# Patient Record
Sex: FEMALE | Race: BLACK OR AFRICAN AMERICAN | HISPANIC OR LATINO | Employment: FULL TIME | URBAN - METROPOLITAN AREA
[De-identification: names, ages, dates, MRNs, and addresses within clinical notes are randomized per-mention and may not be internally consistent; named-entity substitution may affect disease eponyms.]

---

## 2016-11-21 LAB — PHYSICIAN READ PAP  881411: NORMAL

## 2017-04-20 ENCOUNTER — NON-PROVIDER VISIT (OUTPATIENT)
Dept: OBGYN | Facility: CLINIC | Age: 27
End: 2017-04-20
Payer: MEDICAID

## 2017-04-20 DIAGNOSIS — Z32.01 PREGNANCY EXAMINATION OR TEST, POSITIVE RESULT: ICD-10-CM

## 2017-04-20 LAB
INT CON NEG: NEGATIVE
INT CON POS: POSITIVE
POC URINE PREGNANCY TEST: POSITIVE

## 2017-04-20 PROCEDURE — 81025 URINE PREGNANCY TEST: CPT | Performed by: OBSTETRICS & GYNECOLOGY

## 2017-05-17 ENCOUNTER — INITIAL PRENATAL (OUTPATIENT)
Dept: OBGYN | Facility: CLINIC | Age: 27
End: 2017-05-17
Payer: MEDICAID

## 2017-05-17 VITALS
SYSTOLIC BLOOD PRESSURE: 118 MMHG | WEIGHT: 117 LBS | HEIGHT: 63 IN | BODY MASS INDEX: 20.73 KG/M2 | DIASTOLIC BLOOD PRESSURE: 68 MMHG

## 2017-05-17 DIAGNOSIS — N93.8 DUB (DYSFUNCTIONAL UTERINE BLEEDING): ICD-10-CM

## 2017-05-17 PROCEDURE — 76830 TRANSVAGINAL US NON-OB: CPT | Performed by: OBSTETRICS & GYNECOLOGY

## 2017-05-17 PROCEDURE — 99213 OFFICE O/P EST LOW 20 MIN: CPT | Mod: 25 | Performed by: OBSTETRICS & GYNECOLOGY

## 2017-05-17 NOTE — MR AVS SNAPSHOT
"        Astrid Shetty   2017 2:30 PM   Initial Prenatal   MRN: [id number]    [facility]   Dept Phone:  [phone]    Description:  Female : 1990   Provider:  Rene Dodd M.D.           Allergies as of 2017     Allergen Noted Reactions    Lactose 2016   Diarrhea    Diarrhea      Vicodin [Hydrocodone-Acetaminophen] 2012   Palpitations    Dizzy, lightheaded       You were diagnosed with     DUB (dysfunctional uterine bleeding)   [087572]         Vital Signs     Blood Pressure Height Weight Body Mass Index Last Menstrual Period Smoking Status    118/68 mmHg 1.6 m (5' 3\") 53.071 kg (117 lb) 20.73 kg/m2 2017 (Approximate) Never Smoker       Basic Information     Date Of Birth Sex Race Ethnicity Preferred Language    1990 Female Black or  Non- English      Problem List              ICD-10-CM Priority Class Noted - Resolved    Screening for sickle-cell disease or trait - positive Z13.0   2013 - Present    Anemia D64.9   2013 - Present    Postpartum care and examination    8/15/2013 - Present      Health Maintenance        Date Due Completion Dates    IMM HEP B VACCINE (1 of 3 - Primary Series) 1990 ---    IMM HEP A VACCINE (1 of 2 - Standard Series) 1992 ---    IMM HPV VACCINE (1 of 3 - Female 3 Dose Series) 2002 ---    IMM VARICELLA (CHICKENPOX) VACCINE (1 of 2 - 2 Dose Adolescent Series) 2004 ---    PAP SMEAR 2016    IMM DTaP/Tdap/Td Vaccine (2 - Td) 2023            Current Immunizations     Tdap Vaccine 2013  6:15 AM      Below and/or attached are the medications your provider expects you to take. Review all of your home medications and newly ordered medications with your provider and/or pharmacist. Follow medication instructions as directed by your provider and/or pharmacist. Please keep your medication list with you and share with your provider. Update the information " when medications are discontinued, doses are changed, or new medications (including over-the-counter products) are added; and carry medication information at all times in the event of emergency situations     Allergies:  LACTOSE - Diarrhea     VICODIN - Palpitations               Medications  Valid as of: May 17, 2017 -  2:56 PM    Generic Name Brand Name Tablet Size Instructions for use    Oxycodone-Acetaminophen (Tab) PERCOCET 5-325 MG Take 1 Tab by mouth every four hours as needed (pain).        .                 Medicines prescribed today were sent to:     [facility] [address] [place] [address] - [address] [address] [facility] [address]    Phone: [phone] Fax: [phone]    Open 24 Hours?: No      Medication refill instructions:       If your prescription bottle indicates you have medication refills left, it is not necessary to call your provider’s office. Please contact your pharmacy and they will refill your medication.    If your prescription bottle indicates you do not have any refills left, you may request refills at any time through one of the following ways: The online edo system (except Urgent Care), by calling your provider’s office, or by asking your pharmacy to contact your provider’s office with a refill request. Medication refills are processed only during regular business hours and may not be available until the next business day. Your provider may request additional information or to have a follow-up visit with you prior to refilling your medication.   *Please Note: Medication refills are assigned a new Rx number when refilled electronically. Your pharmacy may indicate that no refills were authorized even though a new prescription for the same medication is available at the pharmacy. Please request the medicine by name with the pharmacy before contacting your provider for a refill.        Other Notes About Your Plan     Needs electrophoresis for confirmation of  positive screen.           Snapvine Access Code: [email or web address]  Expires: 5/20/2017  3:10 PM    Snapvine  A secure, online tool to manage your health information     [employer]s Snapvine® is a secure, online tool that connects you to your personalized health information from the privacy of your home -- day or night - making it very easy for you to manage your healthcare. Once the activation process is completed, you can even access your medical information using the Snapvine sana, which is available for free in the Apple Sana store or Google Play store.     Snapvine provides the following levels of access (as shown below):   My Chart Features   [facility] Primary Care Doctor [facility]  Specialists [facility]  Urgent  Care Non-[facility]  Primary Care  Doctor   Email your healthcare team securely and privately 24/7 X X X    Manage appointments: schedule your next appointment; view details of past/upcoming appointments X      Request prescription refills. X      View recent personal medical records, including lab and immunizations X X X X   View health record, including health history, allergies, medications X X X X   Read reports about your outpatient visits, procedures, consult and ER notes X X X X   See your discharge summary, which is a recap of your hospital and/or ER visit that includes your diagnosis, lab results, and care plan. X X       How to register for Snapvine:  1. Go to  [email or web address].  2. Click on the Sign Up Now box, which takes you to the New Member Sign Up page. You will need to provide the following information:  a. Enter your Snapvine Access Code exactly as it appears at the top of this page. (You will not need to use this code after you’ve completed the sign-up process. If you do not sign up before the expiration date, you must request a new code.)   b. Enter your date of birth.   c. Enter your home email address.   d. Click Submit, and follow the next screen’s instructions.  3. Create a Snapvine ID.  This will be your [employer] login ID and cannot be changed, so think of one that is secure and easy to remember.  4. Create a [employer] password. You can change your password at any time.  5. Enter your Password Reset Question and Answer. This can be used at a later time if you forget your password.   6. Enter your e-mail address. This allows you to receive e-mail notifications when new information is available in [employer].  7. Click Sign Up. You can now view your health information.    For assistance activating your [employer] account, call [phone]

## 2017-05-17 NOTE — PROGRESS NOTES
"Astrid Shetty,  26 y.o.  female presents today with a C/O of :oligomenorrhea. Pt   Patient's last menstrual period was 2017 (approximate).               Patient was on OCP's , stopped and became pregnant      Subjective : Nausea/Vomiting: No:  Abdominal /pelvic cramping : No :   vaginal bleeding:No      Menstrual Flow : moderate   GYN ROS:  normal menses, no abnormal bleeding, pelvic pain or discharge, no breast pain or new or enlarging lumps on self exam      Past Medical History   Diagnosis Date   • Allergy      lactose intolerance, vicodin, cherries, tomatoes, pickles, squirt soft drink   • Anemia        History reviewed. No pertinent past surgical history.    Current Birth control:  none    OB History    Para Term  AB SAB TAB Ectopic Multiple Living   2 1 1       1      # Outcome Date GA Lbr Perry/2nd Weight Sex Delivery Anes PTL Lv   2 Current            1 Term 07/10/13 39w1d  3.133 kg (6 lb 14.5 oz) F Vag-Spont EPI N Y      Comments: Denies any complications.              Allergy:      Lactose and Vicodin    Exam;    /68 mmHg  Ht 1.6 m (5' 3\")  Wt 53.071 kg (117 lb)  BMI 20.73 kg/m2  LMP 2017 (Approximate)  well-appearing, well-hydrated  normal;   PERRLA, EOMI, fundi grossly normal, no papilledema, no AV nicking, sclera clear  Clear  RRR No M  abdomen is soft without significant tenderness, masses, organomegaly or guarding  pregnancy pos findings: uterine enlargement: 10 wk size, Lab.    No results found for this or any previous visit (from the past 336 hour(s)).  Ultrasound :     Per my Read   Transvaginal     First trimester findings: Intrauterine gestational sac seen: yes  Gestational sac summary: fetal pole seen, yolk sac seen, fetal cardiac activity, EGA: 10 weeks + 4 days  Fetal cardiac activity: present  Crown-rump length: 3.61 cm  Fetal movement and cardiac   BRENDON 2017    Assessment:    dysfunctional uterine bleeding    Plan:  2 weeks for NOB         "

## 2017-05-17 NOTE — PROGRESS NOTES
[facility]  Last pap done at [facility].   Good # [phone]

## 2017-06-15 ENCOUNTER — HOSPITAL ENCOUNTER (EMERGENCY)
Facility: MEDICAL CENTER | Age: 27
End: 2017-06-16
Attending: EMERGENCY MEDICINE
Payer: MEDICAID

## 2017-06-15 DIAGNOSIS — Z3A.15 15 WEEKS GESTATION OF PREGNANCY: ICD-10-CM

## 2017-06-15 DIAGNOSIS — N39.0 ACUTE UTI: ICD-10-CM

## 2017-06-15 LAB
ALBUMIN SERPL BCP-MCNC: 3.7 G/DL (ref 3.2–4.9)
ALBUMIN/GLOB SERPL: 1.1 G/DL
ALP SERPL-CCNC: 43 U/L (ref 30–99)
ALT SERPL-CCNC: 6 U/L (ref 2–50)
ANION GAP SERPL CALC-SCNC: 9 MMOL/L (ref 0–11.9)
APPEARANCE UR: ABNORMAL
APPEARANCE UR: CLEAR
AST SERPL-CCNC: 12 U/L (ref 12–45)
B-HCG SERPL-ACNC: ABNORMAL MIU/ML (ref 0–5)
BACTERIA #/AREA URNS HPF: ABNORMAL /HPF
BASOPHILS # BLD AUTO: 0.5 % (ref 0–1.8)
BASOPHILS # BLD: 0.02 K/UL (ref 0–0.12)
BILIRUB SERPL-MCNC: 0.4 MG/DL (ref 0.1–1.5)
BILIRUB UR QL STRIP.AUTO: NEGATIVE
BUN SERPL-MCNC: 16 MG/DL (ref 8–22)
CALCIUM SERPL-MCNC: 9 MG/DL (ref 8.5–10.5)
CHLORIDE SERPL-SCNC: 106 MMOL/L (ref 96–112)
CO2 SERPL-SCNC: 20 MMOL/L (ref 20–33)
COLOR UR AUTO: YELLOW
COLOR UR: ABNORMAL
CREAT SERPL-MCNC: 0.54 MG/DL (ref 0.5–1.4)
EOSINOPHIL # BLD AUTO: 0.04 K/UL (ref 0–0.51)
EOSINOPHIL NFR BLD: 0.9 % (ref 0–6.9)
EPI CELLS #/AREA URNS HPF: ABNORMAL /HPF
ERYTHROCYTE [DISTWIDTH] IN BLOOD BY AUTOMATED COUNT: 39.9 FL (ref 35.9–50)
GFR SERPL CREATININE-BSD FRML MDRD: >60 ML/MIN/1.73 M 2
GLOBULIN SER CALC-MCNC: 3.3 G/DL (ref 1.9–3.5)
GLUCOSE SERPL-MCNC: 99 MG/DL (ref 65–99)
GLUCOSE UR QL STRIP.AUTO: NEGATIVE MG/DL
GLUCOSE UR STRIP.AUTO-MCNC: NEGATIVE MG/DL
HCT VFR BLD AUTO: 30.3 % (ref 37–47)
HGB BLD-MCNC: 11 G/DL (ref 12–16)
IMM GRANULOCYTES # BLD AUTO: 0.01 K/UL (ref 0–0.11)
IMM GRANULOCYTES NFR BLD AUTO: 0.2 % (ref 0–0.9)
KETONES UR QL STRIP.AUTO: NEGATIVE MG/DL
KETONES UR STRIP.AUTO-MCNC: NEGATIVE MG/DL
LEUKOCYTE ESTERASE UR QL STRIP.AUTO: ABNORMAL
LEUKOCYTE ESTERASE UR QL STRIP.AUTO: ABNORMAL
LIPASE SERPL-CCNC: 14 U/L (ref 11–82)
LYMPHOCYTES # BLD AUTO: 1.07 K/UL (ref 1–4.8)
LYMPHOCYTES NFR BLD: 24.5 % (ref 22–41)
MCH RBC QN AUTO: 31.4 PG (ref 27–33)
MCHC RBC AUTO-ENTMCNC: 36.3 G/DL (ref 33.6–35)
MCV RBC AUTO: 86.6 FL (ref 81.4–97.8)
MICRO URNS: ABNORMAL
MONOCYTES # BLD AUTO: 0.37 K/UL (ref 0–0.85)
MONOCYTES NFR BLD AUTO: 8.5 % (ref 0–13.4)
NEUTROPHILS # BLD AUTO: 2.86 K/UL (ref 2–7.15)
NEUTROPHILS NFR BLD: 65.4 % (ref 44–72)
NITRITE UR QL STRIP.AUTO: NEGATIVE
NITRITE UR QL STRIP.AUTO: NEGATIVE
NRBC # BLD AUTO: 0 K/UL
NRBC BLD AUTO-RTO: 0 /100 WBC
PH UR STRIP.AUTO: 5 [PH]
PH UR STRIP.AUTO: 5 [PH]
PLATELET # BLD AUTO: 198 K/UL (ref 164–446)
PMV BLD AUTO: 10.7 FL (ref 9–12.9)
POTASSIUM SERPL-SCNC: 3 MMOL/L (ref 3.6–5.5)
PROT SERPL-MCNC: 7 G/DL (ref 6–8.2)
PROT UR QL STRIP: NEGATIVE MG/DL
PROT UR QL STRIP: NEGATIVE MG/DL
RBC # BLD AUTO: 3.5 M/UL (ref 4.2–5.4)
RBC # URNS HPF: ABNORMAL /HPF
RBC UR QL AUTO: NEGATIVE
RBC UR QL AUTO: NEGATIVE
SODIUM SERPL-SCNC: 135 MMOL/L (ref 135–145)
SP GR UR STRIP.AUTO: 1.01
SP GR UR: 1.01
WBC # BLD AUTO: 4.4 K/UL (ref 4.8–10.8)
WBC #/AREA URNS HPF: ABNORMAL /HPF

## 2017-06-15 PROCEDURE — 81001 URINALYSIS AUTO W/SCOPE: CPT

## 2017-06-15 PROCEDURE — 85025 COMPLETE CBC W/AUTO DIFF WBC: CPT

## 2017-06-15 PROCEDURE — 80053 COMPREHEN METABOLIC PANEL: CPT

## 2017-06-15 PROCEDURE — 83690 ASSAY OF LIPASE: CPT

## 2017-06-15 PROCEDURE — 81002 URINALYSIS NONAUTO W/O SCOPE: CPT

## 2017-06-15 PROCEDURE — 700102 HCHG RX REV CODE 250 W/ 637 OVERRIDE(OP): Performed by: EMERGENCY MEDICINE

## 2017-06-15 PROCEDURE — 84702 CHORIONIC GONADOTROPIN TEST: CPT

## 2017-06-15 PROCEDURE — 99284 EMERGENCY DEPT VISIT MOD MDM: CPT

## 2017-06-15 PROCEDURE — A9270 NON-COVERED ITEM OR SERVICE: HCPCS | Performed by: EMERGENCY MEDICINE

## 2017-06-15 RX ORDER — SULFAMETHOXAZOLE AND TRIMETHOPRIM 800; 160 MG/1; MG/1
1 TABLET ORAL EVERY 12 HOURS
Qty: 10 TAB | Refills: 0 | Status: SHIPPED | OUTPATIENT
Start: 2017-06-15 | End: 2017-06-16

## 2017-06-15 RX ORDER — SULFAMETHOXAZOLE AND TRIMETHOPRIM 800; 160 MG/1; MG/1
1 TABLET ORAL ONCE
Status: COMPLETED | OUTPATIENT
Start: 2017-06-16 | End: 2017-06-15

## 2017-06-15 RX ADMIN — SULFAMETHOXAZOLE AND TRIMETHOPRIM 1 TABLET: 800; 160 TABLET ORAL at 23:53

## 2017-06-15 ASSESSMENT — PAIN SCALES - GENERAL: PAINLEVEL_OUTOF10: 7

## 2017-06-15 ASSESSMENT — LIFESTYLE VARIABLES: DO YOU DRINK ALCOHOL: NO

## 2017-06-15 NOTE — ED AVS SNAPSHOT
GoAlbert Access Code: [email or web address]  Expires: 7/15/2017 11:55 PM    GoAlbert  A secure, online tool to manage your health information     [employer]’s GoAlbert® is a secure, online tool that connects you to your personalized health information from the privacy of your home -- day or night - making it very easy for you to manage your healthcare. Once the activation process is completed, you can even access your medical information using the GoAlbert sana, which is available for free in the Apple Sana store or Google Play store.     GoAlbert provides the following levels of access (as shown below):   My Chart Features   [facility] Primary Care Doctor [facility]  Specialists [facility]  Urgent  Care Non-[facility]  Primary Care  Doctor   Email your healthcare team securely and privately 24/7 X X X X   Manage appointments: schedule your next appointment; view details of past/upcoming appointments X      Request prescription refills. X      View recent personal medical records, including lab and immunizations X X X X   View health record, including health history, allergies, medications X X X X   Read reports about your outpatient visits, procedures, consult and ER notes X X X X   See your discharge summary, which is a recap of your hospital and/or ER visit that includes your diagnosis, lab results, and care plan. X X       How to register for GoAlbert:  1. Go to  [email or web address].  2. Click on the Sign Up Now box, which takes you to the New Member Sign Up page. You will need to provide the following information:  a. Enter your GoAlbert Access Code exactly as it appears at the top of this page. (You will not need to use this code after you’ve completed the sign-up process. If you do not sign up before the expiration date, you must request a new code.)   b. Enter your date of birth.   c. Enter your home email address.   d. Click Submit, and follow the next screen’s instructions.  3. Create a GoAlbert ID. This will be your GoAlbert  login ID and cannot be changed, so think of one that is secure and easy to remember.  4. Create a Tr[employer] Systems password. You can change your password at any time.  5. Enter your Password Reset Question and Answer. This can be used at a later time if you forget your password.   6. Enter your e-mail address. This allows you to receive e-mail notifications when new information is available in [employer] Systems.  7. Click Sign Up. You can now view your health information.    For assistance activating your [employer] account, call [phone]

## 2017-06-15 NOTE — ED AVS SNAPSHOT
6/16/2017    Astrid Shetty  [address]  [place] [address]    Dear Astrid:    [facility] wants to ensure your discharge home is safe and you or your loved ones have had all of your questions answered regarding your care after you leave the hospital.    Below is a list of resources and contact information should you have any questions regarding your hospital stay, follow-up instructions, or active medical symptoms.    Questions or Concerns Regarding… Contact   Medical Questions Related to Your Discharge  (7 days a week, 8am-5pm) Contact a Nurse Care Coordinator   [phone]   Medical Questions Not Related to Your Discharge  (24 hours a day / 7 days a week)  Contact the Nurse Health Line   [phone]    Medications or Discharge Instructions Refer to your discharge packet   or contact your [facility] Primary Care Provider   [phone]   Follow-up Appointment(s) Schedule your appointment via Pace4Life   or contact Scheduling [phone]   Billing Review your statement via Pace4Life  or contact Billing [phone]   Medical Records Review your records via Pace4Life   or contact Medical Records [phone]     You may receive a telephone call within two days of discharge. This call is to make certain you understand your discharge instructions and have the opportunity to have any questions answered. You can also easily access your medical information, test results and upcoming appointments via the Pace4Life free online health management tool. You can learn more and sign up at Desigual/Pace4Life. For assistance setting up your Pace4Life account, please call [phone].    Once again, we want to ensure your discharge home is safe and that you have a clear understanding of any next steps in your care. If you have any questions or concerns, please do not hesitate to contact us, we are here for you. Thank you for choosing [facility] for your healthcare needs.    Sincerely,    Your [facility] Healthcare Team

## 2017-06-15 NOTE — ED AVS SNAPSHOT
Home Care Instructions                                                                                                                Astrid Shetty   MRN: [id number]    Department:  [facility], Emergency Dept   Date of Visit:  6/15/2017            [facility] Emergency Dept    [address] [address]    Phone:  [phone]      You were seen by     Tremaine Jack M.D.      Your Diagnosis Was     Acute UTI     N39.0       These are the medications you received during your hospitalization from 06/15/2017 1755 to 06/16/2017 0001     Date/Time Order Dose Route Action    06/15/2017 2353 sulfamethoxazole-trimethoprim (BACTRIM DS) 800-160 MG tablet 1 Tab 1 Tab Oral Given      Follow-up Information     1. Follow up with [facility].    Specialty:  OB/Gyn    Contact information    [address] [address] [phone]        Medication Information     Review all of your home medications and newly ordered medications with your primary doctor and/or pharmacist as soon as possible. Follow medication instructions as directed by your doctor and/or pharmacist.     Please keep your complete medication list with you and share with your physician. Update the information when medications are discontinued, doses are changed, or new medications (including over-the-counter products) are added; and carry medication information at all times in the event of emergency situations.               Medication List      START taking these medications        Instructions    Morning Afternoon Evening Bedtime    sulfamethoxazole-trimethoprim 800-160 MG tablet   Last time this was given:  1 Tab on 6/15/2017 11:53 PM   Commonly known as:  BACTRIM DS        Take 1 Tab by mouth every 12 hours for 5 days.   Dose:  1 Tab                          ASK your doctor about these medications        Instructions    Morning Afternoon Evening Bedtime    PRENATAL 1 PO        Take  by  mouth.                             Where to Get Your Medications      [facility] [place] [address] [place] [address]     Phone:  [phone]    - sulfamethoxazole-trimethoprim 800-160 MG tablet            Procedures and tests performed during your visit     BETA-HCG QUANTITATIVE SERUM    CBC WITH DIFFERENTIAL    COMP METABOLIC PANEL    ESTIMATED GFR    LIPASE    POC UA    URINALYSIS    URINE MICROSCOPIC (W/UA)        Discharge Instructions       Urinary Tract Infection  Urinary tract infections (UTIs) can develop anywhere along your urinary tract. Your urinary tract is your body's drainage system for removing wastes and extra water. Your urinary tract includes two kidneys, two ureters, a bladder, and a urethra. Your kidneys are a pair of bean-shaped organs. Each kidney is about the size of your fist. They are located below your ribs, one on each side of your spine.  CAUSES  Infections are caused by microbes, which are microscopic organisms, including fungi, viruses, and bacteria. These organisms are so small that they can only be seen through a microscope. Bacteria are the microbes that most commonly cause UTIs.  SYMPTOMS   Symptoms of UTIs may vary by age and gender of the patient and by the location of the infection. Symptoms in young women typically include a frequent and intense urge to urinate and a painful, burning feeling in the bladder or urethra during urination. Older women and men are more likely to be tired, shaky, and weak and have muscle aches and abdominal pain. A fever may mean the infection is in your kidneys. Other symptoms of a kidney infection include pain in your back or sides below the ribs, nausea, and vomiting.  DIAGNOSIS  To diagnose a UTI, your caregiver will ask you about your symptoms. Your caregiver also will ask to provide a urine sample. The urine sample will be tested for bacteria and white blood cells. White blood cells  are made by your body to help fight infection.  TREATMENT   Typically, UTIs can be treated with medication. Because most UTIs are caused by a bacterial infection, they usually can be treated with the use of antibiotics. The choice of antibiotic and length of treatment depend on your symptoms and the type of bacteria causing your infection.  HOME CARE INSTRUCTIONS  · If you were prescribed antibiotics, take them exactly as your caregiver instructs you. Finish the medication even if you feel better after you have only taken some of the medication.  · Drink enough water and fluids to keep your urine clear or pale yellow.  · Avoid caffeine, tea, and carbonated beverages. They tend to irritate your bladder.  · Empty your bladder often. Avoid holding urine for long periods of time.  · Empty your bladder before and after sexual intercourse.  · After a bowel movement, women should cleanse from front to back. Use each tissue only once.  SEEK MEDICAL CARE IF:   · You have back pain.  · You develop a fever.  · Your symptoms do not begin to resolve within 3 days.  SEEK IMMEDIATE MEDICAL CARE IF:   · You have severe back pain or lower abdominal pain.  · You develop chills.  · You have nausea or vomiting.  · You have continued burning or discomfort with urination.  MAKE SURE YOU:   · Understand these instructions.  · Will watch your condition.  · Will get help right away if you are not doing well or get worse.     This information is not intended to replace advice given to you by your health care provider. Make sure you discuss any questions you have with your health care provider.     Document Released: 09/27/2006 Document Revised: 01/08/2016 Document Reviewed: 01/25/2013  [employer] Interactive Patient Education ©2016 [employer]            Patient Information     Patient Information    Following emergency treatment: all patient requiring follow-up care must return either to a private physician or a clinic if your condition  worsens before you are able to obtain further medical attention, please return to the emergency room.     Billing Information    At [facility], we work to make the billing process streamlined for our patients.  Our Representatives are here to answer any questions you may have regarding your hospital bill.  If you have insurance coverage and have supplied your insurance information to us, we will submit a claim to your insurer on your behalf.  Should you have any questions regarding your bill, we can be reached online or by phone as follows:  Online: You are able pay your bills online or live chat with our representatives about any billing questions you may have. We are here to help Monday - Friday from 8:00am to 7:30pm and 9:00am - 12:00pm on Saturdays.  Please visit [email or web address]  for more information.   Phone:  [phone] or [phone]    Please note that your emergency physician, surgeon, pathologist, radiologist, anesthesiologist, and other specialists are not employed by [facility] and will therefore bill separately for their services.  Please contact them directly for any questions concerning their bills at the numbers below:     Emergency Physician Services:  [phone]  [facility]:  [phone]  [facility]:  [phone]  [facility]:  [phone]    1. Your final bill may vary from the amount quoted upon discharge if all procedures are not complete at that time, or if your doctor has additional procedures of which we are not aware. You will receive an additional bill if you return to the Emergency Department at [facility] for suture removal regardless of the facility of which the sutures were placed.     2. Please arrange for settlement of this account at the emergency registration.    3. All self-pay accounts are due in full at the time of treatment.  If you are unable to meet this obligation then payment  is expected within 4-5 days.     4. If you have had radiology studies (CT, X-ray, Ultrasound, MRI), you have received a preliminary result during your emergency department visit. Please contact the radiology department [phone] to receive a copy of your final result. Please discuss the Final result with your primary physician or with the follow up physician provided.     Crisis Hotline:  [place] Crisis Hotline:  1-893-UYGKRUX or 1-564.231.4113  [place] Crisis Hotline:    1-425.988.9873 or 082-478-2255         ED Discharge Follow Up Questions    1. In order to provide you with very good care, we would like to follow up with a phone call in the next few days.  May we have your permission to contact you?     YES /  NO    2. What is the best phone number to call you? (       )_____-__________    3. What is the best time to call you?      Morning  /  Afternoon  /  Evening                   Patient Signature:  ____________________________________________________________    Date:  ____________________________________________________________      Your appointments     Jun 28, 2017  2:00 PM   New OB Exam with PC INTAKE, NEW OB   [facility])    [address]  [place] [address]   [phone]

## 2017-06-16 VITALS
BODY MASS INDEX: 20.74 KG/M2 | HEART RATE: 59 BPM | WEIGHT: 117.06 LBS | DIASTOLIC BLOOD PRESSURE: 67 MMHG | RESPIRATION RATE: 14 BRPM | HEIGHT: 63 IN | SYSTOLIC BLOOD PRESSURE: 123 MMHG | OXYGEN SATURATION: 99 % | TEMPERATURE: 98.8 F

## 2017-06-16 RX ORDER — SULFAMETHOXAZOLE AND TRIMETHOPRIM 800; 160 MG/1; MG/1
1 TABLET ORAL EVERY 12 HOURS
Qty: 10 TAB | Refills: 0 | Status: SHIPPED | OUTPATIENT
Start: 2017-06-16 | End: 2017-06-21

## 2017-06-16 NOTE — ED NOTES
Ambulatory to room. Agree with triage note. Denies bleeding or spotting. In gown, chart up for ERP.

## 2017-06-16 NOTE — ED NOTES
Pt ambulates to triage with guest  Chief Complaint   Patient presents with   • Abdominal Cramping     x couple days   • Pregnancy     15 weeks, had ultrasound May 17th   Pt asked to wait in lobby, pt updated on triage process and pt asked to inform RN of any changes.

## 2017-06-16 NOTE — ED NOTES
Verbalizes understanding of discharge and followup instructions. VSS. Prescription sent electronically to patient's pharmacy. Ambulates with steady gait to discharge.

## 2017-06-16 NOTE — ED PROVIDER NOTES
"ED Provider Note    ED Provider Note    Primary care provider: [facility]  Means of arrival: POV  History obtained from: Patient    CHIEF COMPLAINT  Chief Complaint   Patient presents with   • Abdominal Cramping     x couple days   • Pregnancy     15 weeks, had ultrasound May 17th     Seen at 10:39 PM.   HPI  Astrid Nadya Shetty is a 26 y.o.  EGA 15 weeks by ultrasound female who presents to the Emergency Department with 2 days of intermittent low pelvic cramping without modifying factors. Occasionally the cramping radiates to the back.    She denies any fevers, chills, vaginal discharge, dysuria, hematuria, nausea, vomiting. Normal prenatal care. No complications with prior pregnancy.    REVIEW OF SYSTEMS  See HPI,   Remainder of ROS negative.     PAST MEDICAL HISTORY   has a past medical history of Allergy and Anemia.    SURGICAL HISTORY  patient denies any surgical history    SOCIAL HISTORY  Social History   Substance Use Topics   • Smoking status: Never Smoker    • Smokeless tobacco: Never Used   • Alcohol Use: No      Comment: occ      History   Drug Use No       FAMILY HISTORY  Family History   Problem Relation Age of Onset   • Hypertension Mother    • Cancer Maternal Grandmother    • Cancer Maternal Grandfather      lung Cancer       CURRENT MEDICATIONS  Reviewed.  See Encounter Summary.     ALLERGIES  Allergies   Allergen Reactions   • Lactose Diarrhea     Diarrhea     • Vicodin [Hydrocodone-Acetaminophen] Palpitations     Dizzy, lightheaded        PHYSICAL EXAM  VITAL SIGNS: /67 mmHg  Pulse 59  Temp(Src) 37.1 °C (98.8 °F)  Resp 14  Ht 1.6 m (5' 2.99\")  Wt 53.1 kg (117 lb 1 oz)  BMI 20.74 kg/m2  SpO2 99%  LMP 2017 (Approximate)  Constitutional: Awake, alert in no apparent distress.  HENT: Normocephalic, Bilateral external ears normal. Nose normal.   Eyes: Conjunctiva normal, non-icteric, EOMI.    Thorax & Lungs: Easy unlabored respirations, Clear to ascultation " bilaterally.  Cardiovascular: Regular rate, Regular rhythm, No murmurs, rubs or gallops.  Abdomen:  Soft, mild suprapubic tenderness without rebound or guarding, no right upper quadrant or right lower quadrant tenderness, soft, normal active bowel sounds. Gravid abdomen. Fundus well below the umbilicus.  :    Skin: Visualized skin is  Dry, No erythema, No rash.   Musculoskeletal:   No cyanosis, clubbing or edema.  Neurologic: Alert, Grossly non-focal.   Psychiatric: Normal affect, Normal mood  Lymphatic:  No cervical LAD        RADIOLOGY  No orders to display     Bedside ultrasound performed by EP: Normal fetus observed. Fetal heart tones 147. EGA 15 weeks 4 days by head circumference. Normal amniotic fluid. Trace free fluid in the pelvis.    COURSE & MEDICAL DECISION MAKING  Pertinent Labs & Imaging studies reviewed. (See chart for details)    Differential diagnoses include but are not limited to: Spontaneous , discomfort of pregnancy, threatened     10:39 PM - Patient seen and examined at bedside.     11:53 PM- urinalysis reveals an acute UTI.      Decision Making:  This is a 26 y.o. year old female who presents with low pelvic cramping for the past 24 hours. The abdominal exam is quite benign, I do not suspect acute appendicitis. After evaluation is also unremarkable. Bedside ultrasound reveals a viable IUP.    The urinalysis does reveal a UTI which certainly would give the patient low pelvic pain. She'll be treated with Bactrim. I recommend taking Tylenol as needed for the aches and pains. She should return for any severe rectal pain, fevers, intractable vomiting or any other concern.    Also her blood pressure slightly elevated today, there is no evidence of preeclampsia. Recommend she follow-up with her primary care physician for additional management.    The patient will be discharged home in stable condition.    FINAL IMPRESSION  1. Acute UTI    2. 15 weeks gestation of pregnancy

## 2017-06-16 NOTE — DISCHARGE INSTRUCTIONS
Urinary Tract Infection  Urinary tract infections (UTIs) can develop anywhere along your urinary tract. Your urinary tract is your body's drainage system for removing wastes and extra water. Your urinary tract includes two kidneys, two ureters, a bladder, and a urethra. Your kidneys are a pair of bean-shaped organs. Each kidney is about the size of your fist. They are located below your ribs, one on each side of your spine.  CAUSES  Infections are caused by microbes, which are microscopic organisms, including fungi, viruses, and bacteria. These organisms are so small that they can only be seen through a microscope. Bacteria are the microbes that most commonly cause UTIs.  SYMPTOMS   Symptoms of UTIs may vary by age and gender of the patient and by the location of the infection. Symptoms in young women typically include a frequent and intense urge to urinate and a painful, burning feeling in the bladder or urethra during urination. Older women and men are more likely to be tired, shaky, and weak and have muscle aches and abdominal pain. A fever may mean the infection is in your kidneys. Other symptoms of a kidney infection include pain in your back or sides below the ribs, nausea, and vomiting.  DIAGNOSIS  To diagnose a UTI, your caregiver will ask you about your symptoms. Your caregiver also will ask to provide a urine sample. The urine sample will be tested for bacteria and white blood cells. White blood cells are made by your body to help fight infection.  TREATMENT   Typically, UTIs can be treated with medication. Because most UTIs are caused by a bacterial infection, they usually can be treated with the use of antibiotics. The choice of antibiotic and length of treatment depend on your symptoms and the type of bacteria causing your infection.  HOME CARE INSTRUCTIONS  · If you were prescribed antibiotics, take them exactly as your caregiver instructs you. Finish the medication even if you feel better after you  have only taken some of the medication.  · Drink enough water and fluids to keep your urine clear or pale yellow.  · Avoid caffeine, tea, and carbonated beverages. They tend to irritate your bladder.  · Empty your bladder often. Avoid holding urine for long periods of time.  · Empty your bladder before and after sexual intercourse.  · After a bowel movement, women should cleanse from front to back. Use each tissue only once.  SEEK MEDICAL CARE IF:   · You have back pain.  · You develop a fever.  · Your symptoms do not begin to resolve within 3 days.  SEEK IMMEDIATE MEDICAL CARE IF:   · You have severe back pain or lower abdominal pain.  · You develop chills.  · You have nausea or vomiting.  · You have continued burning or discomfort with urination.  MAKE SURE YOU:   · Understand these instructions.  · Will watch your condition.  · Will get help right away if you are not doing well or get worse.     This information is not intended to replace advice given to you by your health care provider. Make sure you discuss any questions you have with your health care provider.     Document Released: 09/27/2006 Document Revised: 01/08/2016 Document Reviewed: 01/25/2013  [employer] Patient Education ©2016 [employer]

## 2017-06-28 ENCOUNTER — INITIAL PRENATAL (OUTPATIENT)
Dept: OBGYN | Facility: CLINIC | Age: 27
End: 2017-06-28
Payer: MEDICAID

## 2017-06-28 ENCOUNTER — HOSPITAL ENCOUNTER (OUTPATIENT)
Facility: MEDICAL CENTER | Age: 27
End: 2017-06-28
Attending: NURSE PRACTITIONER
Payer: MEDICAID

## 2017-06-28 VITALS — SYSTOLIC BLOOD PRESSURE: 106 MMHG | WEIGHT: 118 LBS | BODY MASS INDEX: 20.91 KG/M2 | DIASTOLIC BLOOD PRESSURE: 60 MMHG

## 2017-06-28 DIAGNOSIS — Z34.81 PRENATAL CARE, SUBSEQUENT PREGNANCY, FIRST TRIMESTER: Primary | ICD-10-CM

## 2017-06-28 DIAGNOSIS — Z34.81 PRENATAL CARE, SUBSEQUENT PREGNANCY, FIRST TRIMESTER: ICD-10-CM

## 2017-06-28 LAB
APPEARANCE UR: NORMAL
BILIRUB UR STRIP-MCNC: NORMAL MG/DL
COLOR UR AUTO: NORMAL
GLUCOSE UR STRIP.AUTO-MCNC: NEGATIVE MG/DL
KETONES UR STRIP.AUTO-MCNC: NEGATIVE MG/DL
LEUKOCYTE ESTERASE UR QL STRIP.AUTO: NORMAL
NITRITE UR QL STRIP.AUTO: NEGATIVE
PH UR STRIP.AUTO: 6 [PH] (ref 5–8)
PROT UR QL STRIP: NEGATIVE MG/DL
RBC UR QL AUTO: NEGATIVE
SP GR UR STRIP.AUTO: 1.02
UROBILINOGEN UR STRIP-MCNC: NORMAL MG/DL

## 2017-06-28 PROCEDURE — 87591 N.GONORRHOEAE DNA AMP PROB: CPT

## 2017-06-28 PROCEDURE — 81002 URINALYSIS NONAUTO W/O SCOPE: CPT | Performed by: NURSE PRACTITIONER

## 2017-06-28 PROCEDURE — 59401 PR NEW OB VISIT: CPT | Performed by: NURSE PRACTITIONER

## 2017-06-28 PROCEDURE — 87491 CHLMYD TRACH DNA AMP PROBE: CPT

## 2017-06-28 ASSESSMENT — ENCOUNTER SYMPTOMS
MUSCULOSKELETAL NEGATIVE: 1
RESPIRATORY NEGATIVE: 1
NEUROLOGICAL NEGATIVE: 1
PSYCHIATRIC NEGATIVE: 1
GASTROINTESTINAL NEGATIVE: 1
CONSTITUTIONAL NEGATIVE: 1
CARDIOVASCULAR NEGATIVE: 1
EYES NEGATIVE: 1

## 2017-06-28 NOTE — MR AVS SNAPSHOT
Astridselvin Voell   2017 2:00 PM   Initial Prenatal   MRN: [id number]    Department[facility]   Dept Phone:  [phone]    Description:  Female : 1990   Provider:  Wendy Faust C.N.M.; PC INTAKE           Allergies as of 2017     Allergen Noted Reactions    Lactose 2016   Diarrhea    Diarrhea      Vicodin [Hydrocodone-Acetaminophen] 2012   Palpitations    Dizzy, lightheaded       You were diagnosed with     Prenatal care, subsequent pregnancy, first trimester   [025248]         Vital Signs     Blood Pressure Weight Last Menstrual Period Smoking Status          106/60 mmHg 53.524 kg (118 lb) 2017 (Approximate) Never Smoker         Basic Information     Date Of Birth Sex Race Ethnicity Preferred Language    1990 Female Black or  Non- English      Problem List              ICD-10-CM Priority Class Noted - Resolved    Screening for sickle-cell disease or trait - positive Z13.0   2013 - Present    Anemia D64.9   2013 - Present    Postpartum care and examination    8/15/2013 - Present      Health Maintenance        Date Due Completion Dates    IMM HEP B VACCINE (1 of 3 - Primary Series) 1991 ---    IMM HEP A VACCINE (1 of 2 - Standard Series) 1992 ---    IMM HPV VACCINE (1 of 3 - Female 3 Dose Series) 2002 ---    IMM VARICELLA (CHICKENPOX) VACCINE (1 of 2 - 2 Dose Adolescent Series) 2004 ---    PAP SMEAR 2016    IMM DTaP/Tdap/Td Vaccine (2 - Td) 2023            Results     POCT Urinalysis      Component Value Standard Range & Units    POC Color  Negative    POC Appearance  Negative    POC Leukocyte Esterase Trace Negative    POC Nitrites Negative Negative    POC Urobiligen  Negative (0.2) mg/dL    POC Protein Negative Negative mg/dL    POC Urine PH 6.0 5.0 - 8.0    POC Blood Negative Negative    POC Specific Gravity 1.020 <1.005 - >1.030    POC Ketones Negative Negative mg/dL    POC Biliruben  Negative mg/dL    POC Glucose Negative Negative mg/dL                        Current Immunizations     Tdap Vaccine 7/11/2013  6:15 AM      Below and/or attached are the medications your provider expects you to take. Review all of your home medications and newly ordered medications with your provider and/or pharmacist. Follow medication instructions as directed by your provider and/or pharmacist. Please keep your medication list with you and share with your provider. Update the information when medications are discontinued, doses are changed, or new medications (including over-the-counter products) are added; and carry medication information at all times in the event of emergency situations     Allergies:  LACTOSE - Diarrhea     VICODIN - Palpitations               Medications  Valid as of: June 28, 2017 -  3:25 PM    Generic Name Brand Name Tablet Size Instructions for use    Prenatal MV-Min-Fe Fum-FA-DHA   Take  by mouth.        .                 Medicines prescribed today were sent to:     [facility] [address] [place] [address] [address] [facility]    [address] [place] [id number]    Phone: [phone] Fax: [phone]    Open 24 Hours?: No      Medication refill instructions:       If your prescription bottle indicates you have medication refills left, it is not necessary to call your provider’s office. Please contact your pharmacy and they will refill your medication.    If your prescription bottle indicates you do not have any refills left, you may request refills at any time through one of the following ways: The online [employer] system (except Urgent Care), by calling your provider’s office, or by asking your pharmacy to contact your provider’s office with a refill request. Medication refills are processed only during regular business hours and may not be available until the next business day. Your provider may request additional information or to have a follow-up visit with you prior to refilling your  medication.   *Please Note: Medication refills are assigned a new Rx number when refilled electronically. Your pharmacy may indicate that no refills were authorized even though a new prescription for the same medication is available at the pharmacy. Please request the medicine by name with the pharmacy before contacting your provider for a refill.        Your To Do List     Future Labs/Procedures Complete By Expires    AFP TETRA  As directed 6/28/2018    CHLAMYDIA/GC PCR URINE OR SWAB  As directed 6/28/2018    PREG CNTR PRENATAL PN  As directed 6/28/2018    US-OB 2ND 3RD TRI COMPLETE  As directed 6/28/2018      Other Notes About Your Plan     Needs electrophoresis for confirmation of positive screen.           [employer] Access Code: [email or web address]  Expires: 7/15/2017 11:55 PM    An[employer]Bio  A secure, online tool to manage your health information     [employer]’s An[employer]® is a secure, online tool that connects you to your personalized health information from the privacy of your home -- day or night - making it very easy for you to manage your healthcare. Once the activation process is completed, you can even access your medical information using the An[employer]Bio sana, which is available for free in the Apple Sana store or Google Play store.     [employer] provides the following levels of access (as shown below):   My Chart Features   [facility] Doctor [facility]  Urgent  Care [facility]  Primary Care  Doctor   Email your healthcare team securely and privately 24/7 X X X    Manage appointments: schedule your next appointment; view details of past/upcoming appointments X      Request prescription refills. X      View recent personal medical records, including lab and immunizations X X X X   View health record, including health history, allergies, medications X X X X   Read reports about your outpatient visits, procedures, consult and ER notes X X X X   See your discharge summary, which is a recap of  your hospital and/or ER visit that includes your diagnosis, lab results, and care plan. X X       How to register for [employer]:  1. Go to  [email or web address].  2. Click on the Sign Up Now box, which takes you to the New Member Sign Up page. You will need to provide the following information:  a. Enter your [employer] Access Code exactly as it appears at the top of this page. (You will not need to use this code after you’ve completed the sign-up process. If you do not sign up before the expiration date, you must request a new code.)   b. Enter your date of birth.   c. Enter your home email address.   d. Click Submit, and follow the next screen’s instructions.  3. Create a [employer] ID. This will be your [employer] login ID and cannot be changed, so think of one that is secure and easy to remember.  4. Create a [employer] password. You can change your password at any time.  5. Enter your Password Reset Question and Answer. This can be used at a later time if you forget your password.   6. Enter your e-mail address. This allows you to receive e-mail notifications when new information is available in [employer].  7. Click Sign Up. You can now view your health information.    For assistance activating your [employer] account, call [phone]

## 2017-06-28 NOTE — PROGRESS NOTES
S:  Astrid Shetty is a 26 y.o.  who presents for her new OB exam.  She is 16w4d with and BRENDON of Estimated Date of Delivery: 17 based off of US at 10w4d. She has no complaints.  She is currently working at [employer]. Discussed heavy lifting and chemical exposure. One ER visit for UTI, finished ABT, no problems. No other previous care in this pregnancy.     Desires AFP.  Declines CF.  Denies VB, LOF, or cramping.  Denies dysuria, vaginal DC. Reports some fetal movement.     Pt is single and lives with mother and daughter.  Pregnancy is unplanned but desired.      Past Medical History   Diagnosis Date   • Allergy      lactose intolerance, vicodin, cherries, tomatoes, pickles, squirt soft drink   • Anemia      Family History   Problem Relation Age of Onset   • Hypertension Mother    • Cancer Maternal Grandmother    • Cancer Maternal Grandfather      lung Cancer     Social History     Social History   • Marital Status: Single     Spouse Name: N/A   • Number of Children: N/A   • Years of Education: N/A     Occupational History   • Not on file.     Social History Main Topics   • Smoking status: Never Smoker    • Smokeless tobacco: Never Used   • Alcohol Use: No      Comment: occ   • Drug Use: No   • Sexual Activity:     Partners: Male     Birth Control/ Protection: Pill     Other Topics Concern   • Not on file     Social History Narrative    ** Merged History Encounter **          OB History    Para Term  AB SAB TAB Ectopic Multiple Living   2 1 1       1      # Outcome Date GA Lbr Perry/2nd Weight Sex Delivery Anes PTL Lv   2 Current            1 Term 07/10/13 39w1d  3.133 kg (6 lb 14.5 oz) F Vag-Spont EPI N Y      Comments: Denies any complications.          History of Varicella Virus: Yes  History of HSV I or II in self or partner: no  History of Thyroid problems: no    O:  Blood pressure 106/60, weight 53.524 kg (118 lb), last menstrual period 2017.   See Prenatal  Physical.    Wet mount: deferred, no s/sx      A:   1.  IUP @ 16w4d per LMP c/w 10w4d us        2.  S=D        3.  See problem list below               Patient Active Problem List    Diagnosis Date Noted   • Postpartum care and examination 08/15/2013   • Anemia 04/02/2013   • Screening for sickle-cell disease or trait - positive 01/07/2013         P:  1.  GC/CT & pap done        2.  Prenatal labs ordered - lab slip given        3.  Discussed PNV, diet, avoidances and adequate water intake        4.  NOB packet given        5.  Return to office in 4 wks        6.  Complete OB US in 5-6 wks        7.  AFP    No orders of the defined types were placed in this encounter.       HPI    Review of Systems   Constitutional: Negative.    HENT: Negative.    Eyes: Negative.    Respiratory: Negative.    Cardiovascular: Negative.    Gastrointestinal: Negative.    Genitourinary: Negative.    Musculoskeletal: Negative.    Skin: Negative.    Neurological: Negative.    Endo/Heme/Allergies: Negative.    Psychiatric/Behavioral: Negative.    All other systems reviewed and are negative.         Objective:     /60 mmHg  Wt 53.524 kg (118 lb)  LMP 02/17/2017 (Approximate)     Physical Exam   Constitutional: She is oriented to person, place, and time. She appears well-developed and well-nourished.   HENT:   Head: Normocephalic and atraumatic.   Nose: Nose normal.   Eyes: Conjunctivae and EOM are normal.   Neck: Normal range of motion. Neck supple.   Cardiovascular: Normal rate, regular rhythm, normal heart sounds and intact distal pulses.    Pulmonary/Chest: Effort normal and breath sounds normal.   Abdominal: Soft. Bowel sounds are normal.   Genitourinary: Vagina normal. Uterus is enlarged.   Musculoskeletal: Normal range of motion.   Neurological: She is alert and oriented to person, place, and time. She has normal reflexes.   Skin: Skin is warm and dry.   Psychiatric: She has a normal mood and affect. Her behavior is normal.  Judgment and thought content normal.   Nursing note and vitals reviewed.              Assessment/Plan:     1. Prenatal care, subsequent pregnancy, first trimester  BRENDON 12/09/2017 by LMP, c/w US at 10w4d  - CONSENT FOR CYSTIC FIBROSIS CARRIER TEST  - POCT Urinalysis  - PREG CNTR PRENATAL PN; Future  - CHLAMYDIA/GC PCR URINE OR SWAB; Future  - AFP TETRA; Future  - US-OB 2ND 3RD TRI COMPLETE; Future

## 2017-06-28 NOTE — PROGRESS NOTES
Pt. Here for NOB visit today.  # [phone]  First prenatal care  Pt. States no complaints   Pharmacy verified  Pt would like AFP.

## 2017-06-29 LAB
C TRACH DNA SPEC QL NAA+PROBE: NEGATIVE
N GONORRHOEA DNA SPEC QL NAA+PROBE: NEGATIVE
SPECIMEN SOURCE: NORMAL

## 2017-07-24 ENCOUNTER — HOSPITAL ENCOUNTER (OUTPATIENT)
Dept: LAB | Facility: MEDICAL CENTER | Age: 27
End: 2017-07-24
Attending: NURSE PRACTITIONER
Payer: MEDICAID

## 2017-07-24 ENCOUNTER — TELEPHONE (OUTPATIENT)
Dept: OBGYN | Facility: CLINIC | Age: 27
End: 2017-07-24

## 2017-07-24 DIAGNOSIS — Z34.81 PRENATAL CARE, SUBSEQUENT PREGNANCY, FIRST TRIMESTER: ICD-10-CM

## 2017-07-24 LAB
ABO GROUP BLD: NORMAL
APPEARANCE UR: ABNORMAL
BACTERIA #/AREA URNS HPF: ABNORMAL /HPF
BASOPHILS # BLD AUTO: 0.6 % (ref 0–1.8)
BASOPHILS # BLD: 0.03 K/UL (ref 0–0.12)
BILIRUB UR QL STRIP.AUTO: NEGATIVE
BLD GP AB SCN SERPL QL: NORMAL
COLOR UR: YELLOW
CULTURE IF INDICATED INDCX: YES UA CULTURE
EOSINOPHIL # BLD AUTO: 0.04 K/UL (ref 0–0.51)
EOSINOPHIL NFR BLD: 0.8 % (ref 0–6.9)
EPI CELLS #/AREA URNS HPF: ABNORMAL /HPF
ERYTHROCYTE [DISTWIDTH] IN BLOOD BY AUTOMATED COUNT: 42.6 FL (ref 35.9–50)
GLUCOSE UR STRIP.AUTO-MCNC: NEGATIVE MG/DL
HBV SURFACE AG SER QL: NEGATIVE
HCT VFR BLD AUTO: 32.1 % (ref 37–47)
HGB BLD-MCNC: 11.3 G/DL (ref 12–16)
HIV 1+2 AB+HIV1 P24 AG SERPL QL IA: NON REACTIVE
HYALINE CASTS #/AREA URNS LPF: ABNORMAL /LPF
IMM GRANULOCYTES # BLD AUTO: 0.01 K/UL (ref 0–0.11)
IMM GRANULOCYTES NFR BLD AUTO: 0.2 % (ref 0–0.9)
KETONES UR STRIP.AUTO-MCNC: NEGATIVE MG/DL
LEUKOCYTE ESTERASE UR QL STRIP.AUTO: ABNORMAL
LYMPHOCYTES # BLD AUTO: 0.82 K/UL (ref 1–4.8)
LYMPHOCYTES NFR BLD: 16.4 % (ref 22–41)
MCH RBC QN AUTO: 31.7 PG (ref 27–33)
MCHC RBC AUTO-ENTMCNC: 35.2 G/DL (ref 33.6–35)
MCV RBC AUTO: 89.9 FL (ref 81.4–97.8)
MICRO URNS: ABNORMAL
MONOCYTES # BLD AUTO: 0.34 K/UL (ref 0–0.85)
MONOCYTES NFR BLD AUTO: 6.8 % (ref 0–13.4)
NEUTROPHILS # BLD AUTO: 3.77 K/UL (ref 2–7.15)
NEUTROPHILS NFR BLD: 75.2 % (ref 44–72)
NITRITE UR QL STRIP.AUTO: NEGATIVE
NRBC # BLD AUTO: 0 K/UL
NRBC BLD AUTO-RTO: 0 /100 WBC
PH UR STRIP.AUTO: 7 [PH]
PLATELET # BLD AUTO: 201 K/UL (ref 164–446)
PMV BLD AUTO: 11.3 FL (ref 9–12.9)
PROT UR QL STRIP: NEGATIVE MG/DL
RBC # BLD AUTO: 3.57 M/UL (ref 4.2–5.4)
RBC # URNS HPF: ABNORMAL /HPF
RBC UR QL AUTO: ABNORMAL
RH BLD: NORMAL
RUBV AB SER QL: 79.8 IU/ML
SP GR UR STRIP.AUTO: 1.02
TREPONEMA PALLIDUM IGG+IGM AB [PRESENCE] IN SERUM OR PLASMA BY IMMUNOASSAY: NON REACTIVE
UROBILINOGEN UR STRIP.AUTO-MCNC: 0.2 MG/DL
WBC # BLD AUTO: 5 K/UL (ref 4.8–10.8)
WBC #/AREA URNS HPF: ABNORMAL /HPF

## 2017-07-24 PROCEDURE — 86780 TREPONEMA PALLIDUM: CPT

## 2017-07-24 PROCEDURE — 87340 HEPATITIS B SURFACE AG IA: CPT

## 2017-07-24 PROCEDURE — 87389 HIV-1 AG W/HIV-1&-2 AB AG IA: CPT

## 2017-07-24 PROCEDURE — 87086 URINE CULTURE/COLONY COUNT: CPT

## 2017-07-24 PROCEDURE — 85025 COMPLETE CBC W/AUTO DIFF WBC: CPT

## 2017-07-24 PROCEDURE — 86900 BLOOD TYPING SEROLOGIC ABO: CPT

## 2017-07-24 PROCEDURE — 86850 RBC ANTIBODY SCREEN: CPT

## 2017-07-24 PROCEDURE — 36415 COLL VENOUS BLD VENIPUNCTURE: CPT

## 2017-07-24 PROCEDURE — 81511 FTL CGEN ABNOR FOUR ANAL: CPT

## 2017-07-24 PROCEDURE — 81001 URINALYSIS AUTO W/SCOPE: CPT

## 2017-07-24 PROCEDURE — 86901 BLOOD TYPING SEROLOGIC RH(D): CPT

## 2017-07-24 PROCEDURE — 86762 RUBELLA ANTIBODY: CPT

## 2017-07-24 NOTE — TELEPHONE ENCOUNTER
Pt lvm on nurse line stating she thinks she has a yeast infection but does not know how to treat it. I consulted with Feli Faust she said have pt use monistat 7 and to call back if symptoms get worse.Pt verbalized understanding.

## 2017-07-26 LAB
# FETUSES US: NORMAL
AFP MOM SERPL: 1.04
AFP SERPL-MCNC: 79 NG/ML
AGE - REPORTED: 26.4 YR
BACTERIA UR CULT: NORMAL
GA METHOD: NORMAL
GA: 20.29 WEEKS
HCG MOM SERPL: 0.89
HCG SERPL-ACNC: NORMAL IU/L
IDDM PATIENT QL: NO
INHIBIN A MOM SERPL: 0.86
INHIBIN A SERPL-MCNC: 159 PG/ML
INTEGRATED SCN PATIENT-IMP: NORMAL
PATHOLOGY STUDY: NORMAL
SIGNIFICANT IND 70042: NORMAL
SOURCE SOURCE: NORMAL
U ESTRIOL MOM SERPL: 1.2
U ESTRIOL SERPL-MCNC: 2.85 NG/ML

## 2017-07-27 ENCOUNTER — APPOINTMENT (OUTPATIENT)
Dept: RADIOLOGY | Facility: IMAGING CENTER | Age: 27
End: 2017-07-27
Attending: NURSE PRACTITIONER
Payer: MEDICAID

## 2017-07-27 ENCOUNTER — ROUTINE PRENATAL (OUTPATIENT)
Dept: OBGYN | Facility: CLINIC | Age: 27
End: 2017-07-27
Payer: MEDICAID

## 2017-07-27 ENCOUNTER — DATING (OUTPATIENT)
Dept: OBGYN | Facility: CLINIC | Age: 27
End: 2017-07-27

## 2017-07-27 VITALS — BODY MASS INDEX: 21.26 KG/M2 | SYSTOLIC BLOOD PRESSURE: 110 MMHG | WEIGHT: 120 LBS | DIASTOLIC BLOOD PRESSURE: 64 MMHG

## 2017-07-27 DIAGNOSIS — Z34.82 ENCOUNTER FOR SUPERVISION OF OTHER NORMAL PREGNANCY IN SECOND TRIMESTER: ICD-10-CM

## 2017-07-27 DIAGNOSIS — Z13.0 SCREENING FOR SICKLE-CELL DISEASE OR TRAIT: ICD-10-CM

## 2017-07-27 DIAGNOSIS — Z34.81 PRENATAL CARE, SUBSEQUENT PREGNANCY, FIRST TRIMESTER: ICD-10-CM

## 2017-07-27 PROBLEM — Z34.92 SUPERVISION OF NORMAL PREGNANCY IN SECOND TRIMESTER: Status: ACTIVE | Noted: 2017-07-27

## 2017-07-27 PROCEDURE — 90040 PR PRENATAL FOLLOW UP: CPT | Performed by: NURSE PRACTITIONER

## 2017-07-27 PROCEDURE — 76805 OB US >/= 14 WKS SNGL FETUS: CPT | Performed by: OBSTETRICS & GYNECOLOGY

## 2017-07-27 NOTE — PROGRESS NOTES
S: Astrid Nadya Diogenes at 21o5reperyifl for OB  follow up visit.  Patient  Has no complaints today   Fetal movement is +  Denies any loss of fluid, vaginal bleeding or contractions.    O: VSS  Urine dip NE today has yeast infection and using OTC cream will recheck for yeast and her urine at next visit  See above values  Cervical exam NE    A: multip 20w5d  Sickle cell trait carrier  Anemia     P:   2nd   Trimester Guidance and comfort measures, diet and exercise review.  Labs:  Reviewed labs with client   Encouraged partner testing for Sickle Cell   hemoglobin electrophoresis ordered for client   RTC in 4 weeks   Ferrous Sulfate 325 mg  1 tab 30 min before meals with OJ

## 2017-07-27 NOTE — PROGRESS NOTES
Pt here today for OB follow up  Pt states no complaints   Reports +  Good # [phone]  Pharmacy Confirmed.  Pt had u/s today.

## 2017-07-27 NOTE — MR AVS SNAPSHOT
Astridselvin Voell   2017 1:30 PM   Routine Prenatal   MRN: [id number]    [facility]   Dept Phone:  [phone]    Description:  Female : 1990   Provider:  LEONARDA Basilio           Allergies as of 2017     Allergen Noted Reactions    Lactose 2016   Diarrhea    Diarrhea      Vicodin [Hydrocodone-Acetaminophen] 2012   Palpitations    Dizzy, lightheaded       You were diagnosed with     Encounter for supervision of other normal pregnancy in second trimester   [7656978]       Screening for sickle-cell disease or trait   [V78.2.ICD-9-CM]         Vital Signs     Blood Pressure Weight Last Menstrual Period Smoking Status          110/64 mmHg 54.432 kg (120 lb) 2017 (Approximate) Never Smoker         Basic Information     Date Of Birth Sex Race Ethnicity Preferred Language    1990 Female Black or  Non- English      Problem List              ICD-10-CM Priority Class Noted - Resolved    Screening for sickle-cell disease or trait - positive Z13.0   2013 - Present    Anemia D64.9   2013 - Present    Supervision of normal pregnancy in second trimester Z34.92   2017 - Present      Health Maintenance        Date Due Completion Dates    IMM HEP B VACCINE (1 of 3 - Primary Series) 1990 ---    IMM HEP A VACCINE (1 of 2 - Standard Series) 1992 ---    IMM HPV VACCINE (1 of 3 - Female 3 Dose Series) 2002 ---    IMM VARICELLA (CHICKENPOX) VACCINE (1 of 2 - 2 Dose Adolescent Series) 2004 ---    IMM INFLUENZA (1) 2017 ---    PAP SMEAR 2019, 2013    IMM DTaP/Tdap/Td Vaccine (2 - Td) 2023            Current Immunizations     Tdap Vaccine 2013  6:15 AM      Below and/or attached are the medications your provider expects you to take. Review all of your home medications and newly ordered medications with your provider and/or pharmacist. Follow medication instructions as  directed by your provider and/or pharmacist. Please keep your medication list with you and share with your provider. Update the information when medications are discontinued, doses are changed, or new medications (including over-the-counter products) are added; and carry medication information at all times in the event of emergency situations     Allergies:  LACTOSE - Diarrhea     VICODIN - Palpitations               Medications  Valid as of: July 27, 2017 -  2:14 PM    Generic Name Brand Name Tablet Size Instructions for use    Prenatal MV-Min-Fe Fum-FA-DHA   Take  by mouth.        .                 Medicines prescribed today were sent to:     [facility] [address] [place] [address], [place] - [address]    [address] [place] [address]    Phone: [phone] Fax: [phone]    Open 24 Hours?: No      Medication refill instructions:       If your prescription bottle indicates you have medication refills left, it is not necessary to call your provider’s office. Please contact your pharmacy and they will refill your medication.    If your prescription bottle indicates you do not have any refills left, you may request refills at any time through one of the following ways: The online [employer] system (except Urgent Care), by calling your provider’s office, or by asking your pharmacy to contact your provider’s office with a refill request. Medication refills are processed only during regular business hours and may not be available until the next business day. Your provider may request additional information or to have a follow-up visit with you prior to refilling your medication.   *Please Note: Medication refills are assigned a new Rx number when refilled electronically. Your pharmacy may indicate that no refills were authorized even though a new prescription for the same medication is available at the pharmacy. Please request the medicine by name with the pharmacy before contacting your provider for a refill.        Your To Do List      Future Labs/Procedures Complete By Expires    HEMOGLOBINOPATHY PROFILE  As directed 7/27/2018      Other Notes About Your Plan     Needs electrophoresis for confirmation of positive screen.           [employer] Access Code: [email or web address]  Expires: 8/26/2017  2:06 PM    [employer]  A secure, online tool to manage your health information     [employer]’s [employer]® is a secure, online tool that connects you to your personalized health information from the privacy of your home -- day or night - making it very easy for you to manage your healthcare. Once the activation process is completed, you can even access your medical information using the [employer] sana, which is available for free in the Apple Sana store or Google Play store.     [employer] provides the following levels of access (as shown below):   My Chart Features   [facility] Doctor [facility]  Urgent  Care [facility]  Primary Care  Doctor   Email your healthcare team securely and privately 24/7 X X X    Manage appointments: schedule your next appointment; view details of past/upcoming appointments X      Request prescription refills. X      View recent personal medical records, including lab and immunizations X X X X   View health record, including health history, allergies, medications X X X X   Read reports about your outpatient visits, procedures, consult and ER notes X X X X   See your discharge summary, which is a recap of your hospital and/or ER visit that includes your diagnosis, lab results, and care plan. X X       How to register for [employer]:  1. Go to  [email or web address].  2. Click on the Sign Up Now box, which takes you to the New Member Sign Up page. You will need to provide the following information:  a. Enter your [employer] Access Code exactly as it appears at the top of this page. (You will not need to use this code after you’ve completed the sign-up process. If you do not sign up before the expiration date, you  must request a new code.)   b. Enter your date of birth.   c. Enter your home email address.   d. Click Submit, and follow the next screen’s instructions.  3. Create a [employer] ID. This will be your Woto login ID and cannot be changed, so think of one that is secure and easy to remember.  4. Create a [employer] password. You can change your password at any time.  5. Enter your Password Reset Question and Answer. This can be used at a later time if you forget your password.   6. Enter your e-mail address. This allows you to receive e-mail notifications when new information is available in Woto.  7. Click Sign Up. You can now view your health information.    For assistance activating your W[employer] account, call [phone]

## 2017-08-24 ENCOUNTER — ROUTINE PRENATAL (OUTPATIENT)
Dept: OBGYN | Facility: CLINIC | Age: 27
End: 2017-08-24
Payer: MEDICAID

## 2017-08-24 VITALS — WEIGHT: 127 LBS | BODY MASS INDEX: 22.5 KG/M2 | SYSTOLIC BLOOD PRESSURE: 104 MMHG | DIASTOLIC BLOOD PRESSURE: 58 MMHG

## 2017-08-24 DIAGNOSIS — Z13.0 SCREENING FOR SICKLE-CELL DISEASE OR TRAIT: ICD-10-CM

## 2017-08-24 DIAGNOSIS — Z34.82 ENCOUNTER FOR SUPERVISION OF OTHER NORMAL PREGNANCY IN SECOND TRIMESTER: ICD-10-CM

## 2017-08-24 PROCEDURE — 90040 PR PRENATAL FOLLOW UP: CPT | Performed by: NURSE PRACTITIONER

## 2017-08-24 NOTE — PROGRESS NOTES
OB Follow Up  +FM  Pt denies complaints.   AFP normal  PNP lab work done  U/S done on 7/27/2017  1 hr glucose lab work given today and explained.   [phone]

## 2017-08-24 NOTE — MR AVS SNAPSHOT
Astridselvin Voell   2017 3:45 PM   Routine Prenatal   MRN: [id number]    [facility]   Dept Phone:  [phone]    Description:  Female : 1990   Provider:  LEONARDA Basilio           Allergies as of 2017     Allergen Noted Reactions    Lactose 2016   Diarrhea    Diarrhea      Vicodin [Hydrocodone-Acetaminophen] 2012   Palpitations    Dizzy, lightheaded       You were diagnosed with     Encounter for supervision of other normal pregnancy in second trimester   [[id number]]       Screening for sickle-cell disease or trait   [V78.2.ICD-9-CM]         Vital Signs     Blood Pressure Weight Last Menstrual Period Smoking Status          104/58 mmHg 57.607 kg (127 lb) 2017 (Approximate) Never Smoker         Basic Information     Date Of Birth Sex Race Ethnicity Preferred Language    1990 Female Black or  Non- English      Problem List              ICD-10-CM Priority Class Noted - Resolved    Screening for sickle-cell disease or trait - positive Z13.0   2013 - Present    Anemia D64.9   2013 - Present    Supervision of normal pregnancy in second trimester Z34.92   2017 - Present      Health Maintenance        Date Due Completion Dates    IMM HEP B VACCINE (1 of 3 - Primary Series) 1991 ---    IMM HEP A VACCINE (1 of 2 - Standard Series) 1992 ---    IMM HPV VACCINE (1 of 3 - Female 3 Dose Series) 2002 ---    IMM VARICELLA (CHICKENPOX) VACCINE (1 of 2 - 2 Dose Adolescent Series) 2004 ---    IMM INFLUENZA (1) 2017 ---    PAP SMEAR 2019, 2013    IMM DTaP/Tdap/Td Vaccine (2 - Td) 2023            Current Immunizations     Tdap Vaccine 2013  6:15 AM      Below and/or attached are the medications your provider expects you to take. Review all of your home medications and newly ordered medications with your provider and/or pharmacist. Follow medication instructions as  directed by your provider and/or pharmacist. Please keep your medication list with you and share with your provider. Update the information when medications are discontinued, doses are changed, or new medications (including over-the-counter products) are added; and carry medication information at all times in the event of emergency situations     Allergies:  LACTOSE - Diarrhea     VICODIN - Palpitations               Medications  Valid as of: August 24, 2017 -  4:04 PM    Generic Name Brand Name Tablet Size Instructions for use    Prenatal MV-Min-Fe Fum-FA-DHA   Take  by mouth.        .                 Medicines prescribed today were sent to:     [facility] [address] [place] [address], [place] - [address]    [address] [place] [address]    Phone: [phone] Fax: [phone]    Open 24 Hours?: No      Medication refill instructions:       If your prescription bottle indicates you have medication refills left, it is not necessary to call your provider’s office. Please contact your pharmacy and they will refill your medication.    If your prescription bottle indicates you do not have any refills left, you may request refills at any time through one of the following ways: The online [employer] system (except Urgent Care), by calling your provider’s office, or by asking your pharmacy to contact your provider’s office with a refill request. Medication refills are processed only during regular business hours and may not be available until the next business day. Your provider may request additional information or to have a follow-up visit with you prior to refilling your medication.   *Please Note: Medication refills are assigned a new Rx number when refilled electronically. Your pharmacy may indicate that no refills were authorized even though a new prescription for the same medication is available at the pharmacy. Please request the medicine by name with the pharmacy before contacting your provider for a refill.        Your To Do  List     Future Labs/Procedures Complete By Expires    GLUCOSE 1HR GESTATIONAL  As directed 2/21/2018    HCT  As directed 8/25/2018    HGB  As directed 8/25/2018    T.PALLIDUM AB EIA  As directed 8/24/2018      Other Notes About Your Plan     Needs electrophoresis for confirmation of positive screen.           [employer] Access Code: Activation code not generated  Current [employer] Status: Active

## 2017-08-27 ENCOUNTER — HOSPITAL ENCOUNTER (OUTPATIENT)
Facility: MEDICAL CENTER | Age: 27
End: 2017-08-27
Attending: OBSTETRICS & GYNECOLOGY | Admitting: OBSTETRICS & GYNECOLOGY
Payer: MEDICAID

## 2017-08-27 ENCOUNTER — HOSPITAL ENCOUNTER (EMERGENCY)
Facility: MEDICAL CENTER | Age: 27
End: 2017-08-27
Attending: EMERGENCY MEDICINE
Payer: MEDICAID

## 2017-08-27 VITALS
WEIGHT: 127 LBS | SYSTOLIC BLOOD PRESSURE: 116 MMHG | HEART RATE: 77 BPM | OXYGEN SATURATION: 96 % | DIASTOLIC BLOOD PRESSURE: 47 MMHG | HEIGHT: 63 IN | BODY MASS INDEX: 22.5 KG/M2 | RESPIRATION RATE: 16 BRPM | TEMPERATURE: 97.4 F

## 2017-08-27 VITALS — HEART RATE: 74 BPM | DIASTOLIC BLOOD PRESSURE: 57 MMHG | SYSTOLIC BLOOD PRESSURE: 105 MMHG

## 2017-08-27 DIAGNOSIS — R10.30 LOWER ABDOMINAL PAIN: ICD-10-CM

## 2017-08-27 DIAGNOSIS — Z3A.25 25 WEEKS GESTATION OF PREGNANCY: ICD-10-CM
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
## 2017-08-27 PROCEDURE — 307740 HCHG GREEN TRAUMA TEAM SERVICES

## 2017-08-27 PROCEDURE — 99284 EMERGENCY DEPT VISIT MOD MDM: CPT

## 2017-08-27 PROCEDURE — 700111 HCHG RX REV CODE 636 W/ 250 OVERRIDE (IP)

## 2017-08-27 RX ORDER — METHYLERGONOVINE MALEATE 0.2 MG/ML
INJECTION INTRAVENOUS
Status: DISCONTINUED
Start: 2017-08-27 | End: 2017-08-27 | Stop reason: HOSPADM

## 2017-08-28 NOTE — PROGRESS NOTES
1937- Pt denies any cramping or bleeding. She reports GFM.  [place] has occasional contractions and irritability. Pt given PTL precautions and educated to return if she starts isiah, bleeding or has decreased FM. Pt verbalized understanding. Sent home walking with family
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
## 2017-08-31 ENCOUNTER — TELEPHONE (OUTPATIENT)
Dept: OBGYN | Facility: CLINIC | Age: 27
End: 2017-08-31

## 2017-09-12 ENCOUNTER — HOSPITAL ENCOUNTER (OUTPATIENT)
Dept: LAB | Facility: MEDICAL CENTER | Age: 27
End: 2017-09-12
Attending: NURSE PRACTITIONER
Payer: MEDICAID

## 2017-09-12 DIAGNOSIS — Z13.0 SCREENING FOR SICKLE-CELL DISEASE OR TRAIT: ICD-10-CM

## 2017-09-12 DIAGNOSIS — Z34.82 ENCOUNTER FOR SUPERVISION OF OTHER NORMAL PREGNANCY IN SECOND TRIMESTER: ICD-10-CM

## 2017-09-12 LAB
GLUCOSE 1H P 50 G GLC PO SERPL-MCNC: 109 MG/DL (ref 70–139)
HCT VFR BLD AUTO: 33.7 % (ref 37–47)
HGB BLD-MCNC: 11.8 G/DL (ref 12–16)
TREPONEMA PALLIDUM IGG+IGM AB [PRESENCE] IN SERUM OR PLASMA BY IMMUNOASSAY: NON REACTIVE

## 2017-09-12 PROCEDURE — 36415 COLL VENOUS BLD VENIPUNCTURE: CPT

## 2017-09-12 PROCEDURE — 86780 TREPONEMA PALLIDUM: CPT

## 2017-09-12 PROCEDURE — 85018 HEMOGLOBIN: CPT

## 2017-09-12 PROCEDURE — 83021 HEMOGLOBIN CHROMOTOGRAPHY: CPT

## 2017-09-12 PROCEDURE — 85014 HEMATOCRIT: CPT

## 2017-09-12 PROCEDURE — 82950 GLUCOSE TEST: CPT

## 2017-09-14 LAB
DEPRECATED HGB OTHER BLD-IMP: 0 % (ref 0–0)
HGB A1 MFR BLD: 57.1 % (ref 95–97.9)
HGB A2 MFR BLD: 3.7 % (ref 2–3.5)
HGB C MFR BLD: 0 % (ref 0–0)
HGB E MFR BLD: 0 % (ref 0–0)
HGB F MFR BLD: 0.3 % (ref 0–2.1)
HGB FRACT BLD ELPH-IMP: ABNORMAL
HGB S BLD QL SOLY: ABNORMAL
HGB S MFR BLD: 38.9 % (ref 0–0)
PATH INTERP BLD-IMP: ABNORMAL

## 2017-09-15 PROBLEM — D57.3 SICKLE CELL TRAIT (HCC): Status: ACTIVE | Noted: 2017-09-15

## 2017-09-21 ENCOUNTER — ROUTINE PRENATAL (OUTPATIENT)
Dept: OBGYN | Facility: CLINIC | Age: 27
End: 2017-09-21
Payer: MEDICAID

## 2017-09-21 VITALS — DIASTOLIC BLOOD PRESSURE: 58 MMHG | SYSTOLIC BLOOD PRESSURE: 100 MMHG

## 2017-09-21 DIAGNOSIS — Z34.82 ENCOUNTER FOR SUPERVISION OF OTHER NORMAL PREGNANCY IN SECOND TRIMESTER: Primary | ICD-10-CM

## 2017-09-21 DIAGNOSIS — D57.3 SICKLE CELL TRAIT (HCC): ICD-10-CM

## 2017-09-21 PROCEDURE — 90040 PR PRENATAL FOLLOW UP: CPT | Performed by: NURSE PRACTITIONER

## 2017-09-21 PROCEDURE — 90471 IMMUNIZATION ADMIN: CPT | Performed by: NURSE PRACTITIONER

## 2017-09-21 PROCEDURE — 90715 TDAP VACCINE 7 YRS/> IM: CPT | Performed by: NURSE PRACTITIONER

## 2017-09-21 NOTE — PROGRESS NOTES
S:  Pt is  at 28w5d for routine OB follow up.  No concerns today.  Reports good FM.  Denies VB, LOF, RUCs or vaginal DC.    O:  Please see above vitals.        FHTs: 144        Fundal ht: 28 cm.        S=D      A:  IUP at 28w5d  Patient Active Problem List    Diagnosis Date Noted   • Sickle cell trait (CMS-HCC) 09/15/2017   • Supervision of normal pregnancy in second trimester 2017   • Anemia 2013   • Screening for sickle-cell disease or trait - positive 2013        P:  1.  Declines BTL.          2.  Instructions given on FKCs.          3.  Questions answered.          4.  Encouraged pt to tour L&D.          5.  Encourage adequate water intake.        6.   labor precautions reviewed.         7.  F/u 2 wks.        8.  TDap today.

## 2017-09-21 NOTE — PROGRESS NOTES
Ob f/u. + fetal movement   No VB, LOF or contractions   C/O no complaints today   Phone number # [phone]  Pharmacy verified with patient  WT=  134 lbs            EL=282/58  Kick count given today with instructions   Tdap vaccine given. 09/21/2017 Right  Deltoid. VIS given and screening check list reviewed with pt.  09/21/2017 right

## 2017-09-22 ENCOUNTER — TELEPHONE (OUTPATIENT)
Dept: OBGYN | Facility: CLINIC | Age: 27
End: 2017-09-22

## 2017-09-22 NOTE — TELEPHONE ENCOUNTER
Called pt to notify her that paper work is ready to .    Unable to contact pt msg left to call back.

## 2017-10-05 ENCOUNTER — ROUTINE PRENATAL (OUTPATIENT)
Dept: OBGYN | Facility: CLINIC | Age: 27
End: 2017-10-05
Payer: MEDICAID

## 2017-10-05 VITALS — WEIGHT: 134 LBS | DIASTOLIC BLOOD PRESSURE: 60 MMHG | SYSTOLIC BLOOD PRESSURE: 104 MMHG | BODY MASS INDEX: 23.74 KG/M2

## 2017-10-05 DIAGNOSIS — D57.3 SICKLE CELL TRAIT (HCC): ICD-10-CM

## 2017-10-05 DIAGNOSIS — Z34.82 ENCOUNTER FOR SUPERVISION OF OTHER NORMAL PREGNANCY IN SECOND TRIMESTER: Primary | ICD-10-CM

## 2017-10-05 PROCEDURE — 90040 PR PRENATAL FOLLOW UP: CPT | Performed by: NURSE PRACTITIONER

## 2017-10-05 NOTE — PATIENT INSTRUCTIONS
P:  1.  Flu vaccine declined.        2.  Continue FKCs.          3.  Questions answered.          4.  Encouraged pt to tour L&D.          5.  Encourage adequate water intake.        6.  F/u 2 wks.        7.  UA ordered.        8.  FYI: none.

## 2017-10-05 NOTE — PROGRESS NOTES
S:  Pt is  at 30w5d for routine OB follow up.  No c/o.  Reports good FM.  Denies VB, LOF, RUCs or vaginal DC.    O:  Please see above vitals.        FHTs: 151        Fundal ht: 31 cm.        1hr GTT: wnl -- reviewed w pt.    A:  IUP at 30w5d  Patient Active Problem List    Diagnosis Date Noted   • Sickle cell trait (CMS-[facility]) 09/15/2017   • Supervision of normal pregnancy in second trimester 2017   • Anemia 2013   • Screening for sickle-cell disease or trait - positive 2013        P:  1.  Flu vaccine declined.        2.  Continue FKCs.          3.  Questions answered.          4.  Encouraged pt to tour L&D.          5.  Encourage adequate water intake.        6.  F/u 2 wks.        7.  UA ordered.        8.  FYI: none.

## 2017-10-05 NOTE — PROGRESS NOTES
OB f/u. + fetal movement.  No VB, LOF or UC's.  Wt: 134lb      BP:104/60  Good phone # [phone]  Preferred pharmacy confirmed.  Flu vaccine offered. Patient declined  BTL declined

## 2017-10-18 ENCOUNTER — ROUTINE PRENATAL (OUTPATIENT)
Dept: OBGYN | Facility: CLINIC | Age: 27
End: 2017-10-18
Payer: MEDICAID

## 2017-10-18 VITALS — DIASTOLIC BLOOD PRESSURE: 60 MMHG | WEIGHT: 139.5 LBS | BODY MASS INDEX: 24.71 KG/M2 | SYSTOLIC BLOOD PRESSURE: 110 MMHG

## 2017-10-18 DIAGNOSIS — D57.3 SICKLE CELL TRAIT (HCC): ICD-10-CM

## 2017-10-18 PROCEDURE — 90040 PR PRENATAL FOLLOW UP: CPT | Performed by: NURSE PRACTITIONER

## 2017-10-18 NOTE — PROGRESS NOTES
Pt. Here for OB/FU today. Reports Good FM.   Good # [phone]  Pt states having Juan Miller.   Pharmacy verified.   Flu vaccine declined.   Tdap given 9/21/17  BTL declined.

## 2017-11-02 ENCOUNTER — ROUTINE PRENATAL (OUTPATIENT)
Dept: OBGYN | Facility: CLINIC | Age: 27
End: 2017-11-02
Payer: MEDICAID

## 2017-11-02 VITALS — DIASTOLIC BLOOD PRESSURE: 64 MMHG | SYSTOLIC BLOOD PRESSURE: 112 MMHG | BODY MASS INDEX: 24.98 KG/M2 | WEIGHT: 141 LBS

## 2017-11-02 DIAGNOSIS — D57.3 SICKLE CELL TRAIT (HCC): ICD-10-CM

## 2017-11-02 DIAGNOSIS — Z34.92 ENCOUNTER FOR SUPERVISION OF NORMAL PREGNANCY IN SECOND TRIMESTER, UNSPECIFIED GRAVIDITY: ICD-10-CM

## 2017-11-02 PROCEDURE — 90040 PR PRENATAL FOLLOW UP: CPT | Performed by: NURSE PRACTITIONER

## 2017-11-02 NOTE — PROGRESS NOTES
S) Pt is a 27 y.o.   at 34w5d  gestation. Routine prenatal care today. No complaints today. Discussed GBS to be done at next visit.    Fetal movement Normal  Cramping no,  VB no  LOF no   Denies dysuria. Generally feels well today. Good self-care activities identified. Denies headaches, swelling, visual changes, or epigastric pain .     O) Blood pressure 112/64, weight 64 kg (141 lb), last menstrual period 2017.        Labs:       PNL: WNL       GCT: 109       AFP: Normal       GBS: Next visit       Pertinent ultrasound -        17- Survey WNL, XIOMARA 14.66cm, c/w prev dating    A) IUP at 34w5d       S=D         Patient Active Problem List    Diagnosis Date Noted   • Sickle cell trait (CMS-[facility]) 09/15/2017     Priority: High   • Screening for sickle-cell disease or trait - positive 2013     Priority: High   • Supervision of normal pregnancy in second trimester 2017     Priority: Medium   • Anemia 2013     Priority: Medium                 TDAP: yes       FLU: no        BTL: no       : n/a    P) s/s ptl vs general discomforts. Fetal movements reviewed. General ed and anticipatory guidance. Nutrition/exercise/vitamin. Plans breast Plans pp contraception- unsure  Continue PNV.

## 2017-11-02 NOTE — PROGRESS NOTES
OB f/u. + fetal movement.  No VB, LOF or UC's.  Good phone # [phone]  Preferred pharmacy confirmed  Pt reports no problems at this time

## 2017-11-09 ENCOUNTER — ROUTINE PRENATAL (OUTPATIENT)
Dept: OBGYN | Facility: CLINIC | Age: 27
End: 2017-11-09
Payer: MEDICAID

## 2017-11-09 ENCOUNTER — HOSPITAL ENCOUNTER (OUTPATIENT)
Facility: MEDICAL CENTER | Age: 27
End: 2017-11-09
Attending: NURSE PRACTITIONER
Payer: MEDICAID

## 2017-11-09 VITALS — WEIGHT: 143.6 LBS | DIASTOLIC BLOOD PRESSURE: 60 MMHG | SYSTOLIC BLOOD PRESSURE: 112 MMHG | BODY MASS INDEX: 25.44 KG/M2

## 2017-11-09 DIAGNOSIS — Z34.80 SUPERVISION OF OTHER NORMAL PREGNANCY, ANTEPARTUM: ICD-10-CM

## 2017-11-09 DIAGNOSIS — D57.3 SICKLE CELL TRAIT (HCC): ICD-10-CM

## 2017-11-09 PROCEDURE — 87653 STREP B DNA AMP PROBE: CPT

## 2017-11-09 PROCEDURE — 90040 PR PRENATAL FOLLOW UP: CPT | Performed by: NURSE PRACTITIONER

## 2017-11-09 ASSESSMENT — PATIENT HEALTH QUESTIONNAIRE - PHQ9: CLINICAL INTERPRETATION OF PHQ2 SCORE: 0

## 2017-11-09 NOTE — PROGRESS NOTES
Pt. here for Ob f/u and GBS today. Good # [phone]  Good FM  Pt states no complaints.   Pharmacy verified.

## 2017-11-10 NOTE — PROGRESS NOTES
SUBJECTIVE:  Pt is a 27 y.o.   at 35w5d  gestation. Presents today for follow-up prenatal care. Reports no issues at this time.  Reports good  fetal movement. Denies cramping/contractions, bleeding or leaking of fluid. Denies dysuria, headaches, N/V, or other issues at this time. Generally feels well today.     OBJECTIVE:  - See prenatal vitals flow  -   Vitals:    17 1544   BP: 112/60   Weight: 65.1 kg (143 lb 9.6 oz)      - Pertinent Labs: normal prenatal panel, normal glucose. Sickle +  - Pertinent ultrasound: Normal fetal survey            ASSESSMENT:   - IUP at 35w5d   - S=D   -   Patient Active Problem List    Diagnosis Date Noted   • Sickle cell trait (CMS-[facility]) 09/15/2017     Priority: High   • Screening for sickle-cell disease or trait - positive 2013     Priority: High   • Supervision of normal pregnancy in second trimester 2017     Priority: Medium   • Anemia 2013     Priority: Medium         PLAN:  - S/sx pregnancy and labor warning signs vs general discomforts discussed  - Fetal movements and kick counts reviewed   - Adequate hydration reinforced  - Nutrition/exercise/vitamin education: continued PNV  -GBS done today. Anticipates LARC for contraception.

## 2017-11-11 LAB — GP B STREP DNA SPEC QL NAA+PROBE: NEGATIVE

## 2017-11-17 ENCOUNTER — ROUTINE PRENATAL (OUTPATIENT)
Dept: OBGYN | Facility: CLINIC | Age: 27
End: 2017-11-17
Payer: MEDICAID

## 2017-11-17 VITALS — SYSTOLIC BLOOD PRESSURE: 102 MMHG | DIASTOLIC BLOOD PRESSURE: 60 MMHG | BODY MASS INDEX: 25.86 KG/M2 | WEIGHT: 146 LBS

## 2017-11-17 DIAGNOSIS — Z13.0 SCREENING FOR SICKLE-CELL DISEASE OR TRAIT: ICD-10-CM

## 2017-11-17 DIAGNOSIS — D57.3 SICKLE CELL TRAIT (HCC): ICD-10-CM

## 2017-11-17 PROCEDURE — 90040 PR PRENATAL FOLLOW UP: CPT | Performed by: OBSTETRICS & GYNECOLOGY

## 2017-11-17 NOTE — PROGRESS NOTES
27 y.o.  36w6d The patient is here for routine obstetrical followup. She reports good fetal movement. She denies regular contractions, vaginal bleeding, or leaking of fluid.  Feels occasional sharp pains, which she thinks may be contractions.     The patient's pregnancy is complicated by   Patient Active Problem List    Diagnosis Date Noted   • Sickle cell trait (CMS-[facility]) 09/15/2017     Priority: High   • Screening for sickle-cell disease or trait - positive 2013     Priority: High   • Supervision of normal pregnancy in second trimester 2017     Priority: Medium   • Anemia 2013     Priority: Medium         Followup in 1 week   labor precautions were discussed with patient  Fetal kick counts were discussed with patient

## 2017-11-17 NOTE — PROGRESS NOTES
Pt here today for OB follow up  Reports +FM  C/o pelvic pain and contractions that make it hard for her to breath  Good # [phone]

## 2017-11-25 ENCOUNTER — HOSPITAL ENCOUNTER (INPATIENT)
Facility: MEDICAL CENTER | Age: 27
LOS: 2 days | End: 2017-11-27
Attending: OBSTETRICS & GYNECOLOGY | Admitting: OBSTETRICS & GYNECOLOGY
Payer: MEDICAID

## 2017-11-25 LAB
BASOPHILS # BLD AUTO: 0.2 % (ref 0–1.8)
BASOPHILS # BLD: 0.01 K/UL (ref 0–0.12)
EOSINOPHIL # BLD AUTO: 0.01 K/UL (ref 0–0.51)
EOSINOPHIL NFR BLD: 0.2 % (ref 0–6.9)
ERYTHROCYTE [DISTWIDTH] IN BLOOD BY AUTOMATED COUNT: 43 FL (ref 35.9–50)
ERYTHROCYTE [DISTWIDTH] IN BLOOD BY AUTOMATED COUNT: 43.5 FL (ref 35.9–50)
HCT VFR BLD AUTO: 34.5 % (ref 37–47)
HCT VFR BLD AUTO: 35.9 % (ref 37–47)
HGB BLD-MCNC: 12.4 G/DL (ref 12–16)
HGB BLD-MCNC: 12.8 G/DL (ref 12–16)
HOLDING TUBE BB 8507: NORMAL
IMM GRANULOCYTES # BLD AUTO: 0.03 K/UL (ref 0–0.11)
IMM GRANULOCYTES NFR BLD AUTO: 0.5 % (ref 0–0.9)
LYMPHOCYTES # BLD AUTO: 0.8 K/UL (ref 1–4.8)
LYMPHOCYTES NFR BLD: 13.6 % (ref 22–41)
MCH RBC QN AUTO: 32.4 PG (ref 27–33)
MCH RBC QN AUTO: 32.9 PG (ref 27–33)
MCHC RBC AUTO-ENTMCNC: 35.7 G/DL (ref 33.6–35)
MCHC RBC AUTO-ENTMCNC: 35.9 G/DL (ref 33.6–35)
MCV RBC AUTO: 90.9 FL (ref 81.4–97.8)
MCV RBC AUTO: 91.5 FL (ref 81.4–97.8)
MONOCYTES # BLD AUTO: 0.42 K/UL (ref 0–0.85)
MONOCYTES NFR BLD AUTO: 7.1 % (ref 0–13.4)
NEUTROPHILS # BLD AUTO: 4.63 K/UL (ref 2–7.15)
NEUTROPHILS NFR BLD: 78.4 % (ref 44–72)
NRBC # BLD AUTO: 0 K/UL
NRBC BLD AUTO-RTO: 0 /100 WBC
PLATELET # BLD AUTO: 150 K/UL (ref 164–446)
PLATELET # BLD AUTO: 153 K/UL (ref 164–446)
PMV BLD AUTO: 10.7 FL (ref 9–12.9)
PMV BLD AUTO: 11.2 FL (ref 9–12.9)
RBC # BLD AUTO: 3.77 M/UL (ref 4.2–5.4)
RBC # BLD AUTO: 3.95 M/UL (ref 4.2–5.4)
WBC # BLD AUTO: 10.9 K/UL (ref 4.8–10.8)
WBC # BLD AUTO: 5.9 K/UL (ref 4.8–10.8)

## 2017-11-25 PROCEDURE — 10907ZC DRAINAGE OF AMNIOTIC FLUID, THERAPEUTIC FROM PRODUCTS OF CONCEPTION, VIA NATURAL OR ARTIFICIAL OPENING: ICD-10-PCS | Performed by: STUDENT IN AN ORGANIZED HEALTH CARE EDUCATION/TRAINING PROGRAM

## 2017-11-25 PROCEDURE — 700102 HCHG RX REV CODE 250 W/ 637 OVERRIDE(OP): Performed by: NURSE PRACTITIONER

## 2017-11-25 PROCEDURE — 304965 HCHG RECOVERY SERVICES

## 2017-11-25 PROCEDURE — A9270 NON-COVERED ITEM OR SERVICE: HCPCS | Performed by: NURSE PRACTITIONER

## 2017-11-25 PROCEDURE — 770002 HCHG ROOM/CARE - OB PRIVATE (112)

## 2017-11-25 PROCEDURE — 36415 COLL VENOUS BLD VENIPUNCTURE: CPT

## 2017-11-25 PROCEDURE — 700105 HCHG RX REV CODE 258: Performed by: NURSE PRACTITIONER

## 2017-11-25 PROCEDURE — 700111 HCHG RX REV CODE 636 W/ 250 OVERRIDE (IP)

## 2017-11-25 PROCEDURE — 85027 COMPLETE CBC AUTOMATED: CPT

## 2017-11-25 PROCEDURE — 303615 HCHG EPIDURAL/SPINAL ANESTHESIA FOR LABOR

## 2017-11-25 PROCEDURE — 59409 OBSTETRICAL CARE: CPT

## 2017-11-25 PROCEDURE — 85025 COMPLETE CBC W/AUTO DIFF WBC: CPT

## 2017-11-25 PROCEDURE — 0HQ9XZZ REPAIR PERINEUM SKIN, EXTERNAL APPROACH: ICD-10-PCS | Performed by: STUDENT IN AN ORGANIZED HEALTH CARE EDUCATION/TRAINING PROGRAM

## 2017-11-25 PROCEDURE — 700111 HCHG RX REV CODE 636 W/ 250 OVERRIDE (IP): Performed by: NURSE PRACTITIONER

## 2017-11-25 RX ORDER — ONDANSETRON 4 MG/1
4 TABLET, ORALLY DISINTEGRATING ORAL EVERY 6 HOURS PRN
Status: DISCONTINUED | OUTPATIENT
Start: 2017-11-25 | End: 2017-11-27 | Stop reason: HOSPADM

## 2017-11-25 RX ORDER — VITAMIN A ACETATE, BETA CAROTENE, ASCORBIC ACID, CHOLECALCIFEROL, .ALPHA.-TOCOPHEROL ACETATE, DL-, THIAMINE MONONITRATE, RIBOFLAVIN, NIACINAMIDE, PYRIDOXINE HYDROCHLORIDE, FOLIC ACID, CYANOCOBALAMIN, CALCIUM CARBONATE, FERROUS FUMARATE, ZINC OXIDE, CUPRIC OXIDE 3080; 12; 120; 400; 1; 1.84; 3; 20; 22; 920; 25; 200; 27; 10; 2 [IU]/1; UG/1; MG/1; [IU]/1; MG/1; MG/1; MG/1; MG/1; MG/1; [IU]/1; MG/1; MG/1; MG/1; MG/1; MG/1
1 TABLET, FILM COATED ORAL EVERY MORNING
Status: DISCONTINUED | OUTPATIENT
Start: 2017-11-26 | End: 2017-11-27 | Stop reason: HOSPADM

## 2017-11-25 RX ORDER — ONDANSETRON 2 MG/ML
4 INJECTION INTRAMUSCULAR; INTRAVENOUS EVERY 6 HOURS PRN
Status: DISCONTINUED | OUTPATIENT
Start: 2017-11-25 | End: 2017-11-27 | Stop reason: HOSPADM

## 2017-11-25 RX ORDER — DEXTROSE, SODIUM CHLORIDE, SODIUM LACTATE, POTASSIUM CHLORIDE, AND CALCIUM CHLORIDE 5; .6; .31; .03; .02 G/100ML; G/100ML; G/100ML; G/100ML; G/100ML
INJECTION, SOLUTION INTRAVENOUS CONTINUOUS
Status: DISCONTINUED | OUTPATIENT
Start: 2017-11-25 | End: 2017-11-25 | Stop reason: HOSPADM

## 2017-11-25 RX ORDER — MISOPROSTOL 200 UG/1
800 TABLET ORAL
Status: DISCONTINUED | OUTPATIENT
Start: 2017-11-25 | End: 2017-11-25 | Stop reason: HOSPADM

## 2017-11-25 RX ORDER — OXYCODONE AND ACETAMINOPHEN 10; 325 MG/1; MG/1
1 TABLET ORAL EVERY 4 HOURS PRN
Status: DISCONTINUED | OUTPATIENT
Start: 2017-11-25 | End: 2017-11-27 | Stop reason: HOSPADM

## 2017-11-25 RX ORDER — IBUPROFEN 800 MG/1
800 TABLET ORAL EVERY 8 HOURS PRN
Status: DISCONTINUED | OUTPATIENT
Start: 2017-11-25 | End: 2017-11-27 | Stop reason: HOSPADM

## 2017-11-25 RX ORDER — SODIUM CHLORIDE, SODIUM LACTATE, POTASSIUM CHLORIDE, CALCIUM CHLORIDE 600; 310; 30; 20 MG/100ML; MG/100ML; MG/100ML; MG/100ML
INJECTION, SOLUTION INTRAVENOUS CONTINUOUS
Status: DISPENSED | OUTPATIENT
Start: 2017-11-25 | End: 2017-11-25

## 2017-11-25 RX ORDER — ACETAMINOPHEN 325 MG/1
325 TABLET ORAL EVERY 4 HOURS PRN
Status: DISCONTINUED | OUTPATIENT
Start: 2017-11-25 | End: 2017-11-27 | Stop reason: HOSPADM

## 2017-11-25 RX ORDER — SODIUM CHLORIDE, SODIUM LACTATE, POTASSIUM CHLORIDE, CALCIUM CHLORIDE 600; 310; 30; 20 MG/100ML; MG/100ML; MG/100ML; MG/100ML
INJECTION, SOLUTION INTRAVENOUS PRN
Status: DISCONTINUED | OUTPATIENT
Start: 2017-11-25 | End: 2017-11-27 | Stop reason: HOSPADM

## 2017-11-25 RX ORDER — DOCUSATE SODIUM 100 MG/1
100 CAPSULE, LIQUID FILLED ORAL 2 TIMES DAILY PRN
Status: DISCONTINUED | OUTPATIENT
Start: 2017-11-25 | End: 2017-11-27 | Stop reason: HOSPADM

## 2017-11-25 RX ORDER — OXYCODONE HYDROCHLORIDE AND ACETAMINOPHEN 5; 325 MG/1; MG/1
1 TABLET ORAL EVERY 4 HOURS PRN
Status: DISCONTINUED | OUTPATIENT
Start: 2017-11-25 | End: 2017-11-27 | Stop reason: HOSPADM

## 2017-11-25 RX ORDER — ROPIVACAINE HYDROCHLORIDE 2 MG/ML
INJECTION, SOLUTION EPIDURAL; INFILTRATION; PERINEURAL
Status: COMPLETED
Start: 2017-11-25 | End: 2017-11-25

## 2017-11-25 RX ORDER — ALUMINA, MAGNESIA, AND SIMETHICONE 2400; 2400; 240 MG/30ML; MG/30ML; MG/30ML
30 SUSPENSION ORAL EVERY 6 HOURS PRN
Status: DISCONTINUED | OUTPATIENT
Start: 2017-11-25 | End: 2017-11-25 | Stop reason: HOSPADM

## 2017-11-25 RX ORDER — METHYLERGONOVINE MALEATE 0.2 MG/ML
0.2 INJECTION INTRAVENOUS
Status: DISCONTINUED | OUTPATIENT
Start: 2017-11-25 | End: 2017-11-25 | Stop reason: HOSPADM

## 2017-11-25 RX ADMIN — ROPIVACAINE HYDROCHLORIDE 100 ML: 2 INJECTION, SOLUTION EPIDURAL; INFILTRATION at 04:07

## 2017-11-25 RX ADMIN — SODIUM CHLORIDE, POTASSIUM CHLORIDE, SODIUM LACTATE AND CALCIUM CHLORIDE: 600; 310; 30; 20 INJECTION, SOLUTION INTRAVENOUS at 04:23

## 2017-11-25 RX ADMIN — Medication 125 ML/HR: at 12:30

## 2017-11-25 RX ADMIN — Medication 1 MILLI-UNITS/MIN: at 07:41

## 2017-11-25 RX ADMIN — IBUPROFEN 800 MG: 800 TABLET, FILM COATED ORAL at 16:49

## 2017-11-25 ASSESSMENT — LIFESTYLE VARIABLES
ALCOHOL_USE: NO
EVER_SMOKED: NEVER

## 2017-11-25 ASSESSMENT — PAIN SCALES - GENERAL
PAINLEVEL_OUTOF10: 8
PAINLEVEL_OUTOF10: 0
PAINLEVEL_OUTOF10: 2

## 2017-11-25 NOTE — PROGRESS NOTES
In to see patient, comfortable in bed  SVE 6-7/90/0, AROM with light meconium noted.  TOCO q 2-3 min, palpate mod-firm  FHT- 150bpm, mod variability, pos accels      Anticipate     Siobhan Beck MD

## 2017-11-25 NOTE — PROGRESS NOTES
0145- a , edc 17 (38.0) pt of pregnancy center presenting to labor and delivery c/o contractions growing in intensity since 2200 last night. Pt denies any leaking of fluid of vaginal bleeding. PT sates positive fetal movement. Pt says last office visit was last Friday.

## 2017-11-25 NOTE — CARE PLAN
Problem: Pain  Goal: Alleviation of Pain or a reduction in pain to the patient's comfort goal    Intervention: Pain Management - Epidural/Spinal  Pt requesting epidural for pain management. Dr. Ugarte placed epidural without incidence.      Problem: Risk for injury  Goal: Patient and fetus will be free of preventable injury/complications  Pt and fetus will be monitored closely throughout labor and delivery. TOCO/FM on for monitoring.

## 2017-11-25 NOTE — PROGRESS NOTES
"Report received.    JEY Beck at bedside, SVE and AROM/mec    PT denies pain or additional needs.     0925 SVE C/+1. Yousif aware, labor down.    1050 at bedside, paddles set up.    1055 pt pushing, 10L oxygen vai mask placed.    1100 Call to Yousif and Beck.    1112 SVE viable male with 8/8 APGARS.    1445 Bleeding per doc flow, PT sat up at bedside, when attempting to stand, her right leg was \"tto numb,' PT put back in bed.  Straight cath placed with sterile technique. 1500 clear urine out.    1600 PT up to WC with assistance, denies dizziness or HA.      "

## 2017-11-25 NOTE — DELIVERY NOTE
VAGINAL DELIVERY PRODEDURE NOTE    PATIENT ID:  NAME:  Astrid Shetty  MRN:               [id number]  YOB: 1990    On 2017 at 11:12, this 27 y.o., now 38w0d , GBS neg female delivered via  under epidural anesthesia a viable male infant weighing 3305g with APGAR scores of 8 and 8 at one and five minutes. There was no nuchal cord. Infant was suctioned at delivery because of light meconium present at [facility]. Cord was doubly clamped, cut and infant handed to RN in attendance. An intact placenta was delivered spontaneously at 11:22 with 3 vessel cord. Upon vaginal exam, there was first degree laceration which was repaired using 2.0 chromic in the usual sterile fashion. Estimated blood loss was 300cc. Patient will be transferred to postpartum in stable condition and infant to  nursery.    Siobhan Beck M.D.    Delivery attended by Dr. Dodd who was present for the entire delivery.

## 2017-11-25 NOTE — H&P
History and Physical    Astrid Shetty is a 27 y.o. female  -Para:     Gestational Age:  38w0d  Admitted for:   Active Labor  Admitted to  [facility].  [facility]    HPI: Patient is admitted with the above mentioned Chief Complaint and States   Loss of fluid:   negative  Abdominal Pain:  negative  Uterine Contractions:  positive  Vaginal Bleeding:  negative  Fetal Movement:  normal  Patient denies fever, chills, nausea, vomiting , headache, visual disturbance, or dysuria  Patient's last menstrual period was 2017 (approximate).  Estimated Date of Delivery: 17  Final BRENDON: 2017, by Ultrasound    Patient Active Problem List    Diagnosis Date Noted   • Sickle cell trait (CMS-HCC) 09/15/2017     Priority: High   • Screening for sickle-cell disease or trait - positive 2013     Priority: High   • Supervision of normal pregnancy in second trimester 2017     Priority: Medium   • Anemia 2013     Priority: Medium       Admitting DX: Pregnancy  Pregnancy Complications:  none  OB Risk Factors:   None  Labor State:    Active phase labor.    History:   has a past medical history of Allergy and Anemia. She also has no past medical history of Addisons disease (CMS-HCC); Adrenal disorder (CMS-HCC); Anxiety; Arrhythmia; Arthritis; ASTHMA; Blood transfusion; Cancer (CMS-HCC); CATARACT; CHF (congestive heart failure) (CMS-HCC); Clotting disorder (CMS-HCC); COPD; Cushings syndrome (CMS-HCC); Depression; Diabetes; Diabetic neuropathy (CMS-HCC); EMPHYSEMA; GERD (gastroesophageal reflux disease); Glaucoma; Goiter; Headache(784.0); Heart attack; Heart murmur; HIV (human immunodeficiency virus infection); Hyperlipidemia; Hypertension; IBD (inflammatory bowel disease); Kidney disease; Meningitis; Migraine; Muscle disorder; OSTEOPOROSIS; Parathyroid disorder (CMS-[facility]); Pituitary disease (CMS-HCC); Seizure (CMS-HCC); Sickle cell disease  "(CMS-HCC); Stroke ([facility]); Substance abuse; Thyroid disease; Tuberculosis; Ulcer (CMS-HCC); or Urinary tract infection, site not specified.     has no past surgical history on file.    OB History    Para Term  AB Living   2 1 1 0 0 1   SAB TAB Ectopic Molar Multiple Live Births   0 0 0 0 0 1      # Outcome Date GA Lbr Perry/2nd Weight Sex Delivery Anes PTL Lv   2 Current            1 Term 07/10/13 39w1d  3.133 kg (6 lb 14.5 oz) F Vag-Spont EPI N SAM      Birth Comments: Denies any complications.          Medications:  No current facility-administered medications on file prior to encounter.      Current Outpatient Prescriptions on File Prior to Encounter   Medication Sig Dispense Refill   • Prenatal MV-Min-Fe Fum-FA-DHA (PRENATAL 1 PO) Take  by mouth.     • Prenatal MV-Min-Fe Fum-FA-DHA (PRENATAL 1 PO) Take  by mouth.         Allergies:  Lactose and Vicodin [hydrocodone-acetaminophen]    ROS:   Neuro: negative    Cardiovascular: negative  Gastro intestinal: negative  Genitourinary: negative            Physical Exam:  /73   Pulse 74   Temp 36 °C (96.8 °F)   Ht 1.6 m (5' 3\")   Wt 66.2 kg (146 lb)   LMP 2017 (Approximate)   BMI 25.86 kg/m²   Constitutional: healthy-appearing, Well-developed, well-nourished, in no acute distress  No JVD: while supine  HEENT: EOMI and Thyroid without masses  Breast Exam: negative  Cardio: regular rate and rhythm  Lung: unlabored respirations, no intercostal retractions or accessory muscle use, clear to auscultation without rales or wheezes  Abdomen: abdomen is soft without significant tenderness, masses, organomegaly or guarding  Extremity: extremities, peripheral pulses and reflexes normal, no edema, redness or tenderness in the calves or thighs, feet normal, good pulses, normal color, temperature and sensation    Cervical Exam: 80%  Cervix Dilatation: 4  Station: negative 2  Pelvis: Normal  Fetal Assessment: Fetal heart variability: moderate  Fetal Heart " Rate decelerations: none  Fetal Heart Rate accelerations: yes  Baseline FHR: 130 per minute  Uterine contractions: regular, every 2-5 minutes  Estimated Fetal Weight: 3000 - 3500g      Labs:      Prenatal Results     1st Trimester     Test Value Date Time    ABO O  07/24/17 1239    RH POS  07/24/17 1239    Antibody NEG  07/24/17 1239    CBC/PLT/DIFF       HGB 11.8 g/dL (L) 09/12/17 0946    Platelets 201 K/uL 07/24/17 1239    HGB A1C        1 Hr  mg/dL 09/12/17 0946    3 Hr GTT       Rubella 79.80 IU/mL 07/24/17 1239    RPR Non Reactive  01/04/13 1418    Urine Culture Mixed skin ofe 10-50,000 cfu/mL  07/24/17 1238    24 Urine Protein        24 Urine Creatinine        HBsAg Negative  07/24/17 1239    Hep CAB        HIV       Gonorrhea       Chlamydia       TSH        Free T4         TB       Pap NEG  11/21/16     SYPHILUS TREP QUAL [id number] [[id number]][             2nd Trimester     Test Value Date Time    HCT 33.7 % (L) 09/12/17 0946    HGB 11.8 g/dL (L) 09/12/17 0946    1 Hr  mg/dL 09/12/17 0946    3 Hr GTT             24-28 Weeks     Test Value Date Time    1 Hr GCT  109 mg/dL 09/12/17 0946    TSH        Free T4        24 Urine Protein       24 Urine Creatinine       BUN 16 mg/dL 06/15/17 1818    Creatinine 0.54 mg/dL 06/15/17 1818    GFR >60 mL/min/1.73 m 2 06/15/17 1818    AST 12 U/L 06/15/17 1818    ALT 6 U/L 06/15/17 1818    Uric Acid       LDH             3rd Trimester     Test Value Date Time    HCT 33.7 % (L) 09/12/17 0946    HGB 11.8 g/dL (L) 09/12/17 0946    TSH       Free T4       24 Hr Urine Protein       24 Hr Urine Creatinine       SYPHILUS TREP QUAL Non Reactive  09/12/17 0946          35-37 Weeks     Test Value Date Time    GBS PCR LB Negative  11/09/17 1606    GBS PCR Negative  11/09/17 1606          Genetic Screening     Test Value Date Time    Cystic Fibrosis       AFP Quad Normal  07/24/17 1239    Sickle Cell Positive  (A) 01/04/13 1418                  Assessment:  Gestational Age:   38w0d  Labor State:   Labor, Active  Risk Factors:   none  Pregnancy Complications: none    Patient Active Problem List    Diagnosis Date Noted   • Sickle cell trait (CMS-[facility]) 09/15/2017     Priority: High   • Screening for sickle-cell disease or trait - positive 2013     Priority: High   • Supervision of normal pregnancy in second trimester 2017     Priority: Medium   • Anemia 2013     Priority: Medium       Plan:   Admitted for: Active Labor    CBC  routine urinalysis  Antibiotics: Not indicated at this time    Epidural as desired  GBS negative  Anticipate     Wendy Faust C.N.M.     Dr Montanez is the attending MD today

## 2017-11-25 NOTE — RESPIRATORY CARE
Attendance at Delivery    Reason for attendance Meconium   Oxygen Needed no   Positive Pressure Needed no   Baby Vigorous yes   Evidence of Meconium yes light to moderate.  Apgar's 8-8 Baby pink and crying before leaving the room.

## 2017-11-25 NOTE — PROGRESS NOTES
0200-rcvd report from Ambika Ruiz Rn and assumed care of pt.  0215-Wendy Faust CNM called and updated on pt. Orders rcvd to obtain reactive NST then walk pt and recheck.  0235-reactive NST obtained and pt walking.  0302-pt back from walking, unable to walk any longer. SVE=4/80/-2   Wendy Faust updated and orders rcvd to admit pt to L&D for delivery.  0320-IV started, labs drawn and admit profile completed. IV bolus infusing for epdidural placement.  0358-Dr. Ugarte at bedside to place epidural. Epidural placed without incidence.  0416-right after epidural was placed pt began coughing and then started throwing up. Pts vital signs remained stable.   0440-pt now resting comfortably with epidural.  0548-Wendy Faust CNM at bedside to assess pt. SVE=5/70/-1  0700-report given to dayshift RN

## 2017-11-26 PROCEDURE — A9270 NON-COVERED ITEM OR SERVICE: HCPCS | Performed by: NURSE PRACTITIONER

## 2017-11-26 PROCEDURE — 700102 HCHG RX REV CODE 250 W/ 637 OVERRIDE(OP): Performed by: NURSE PRACTITIONER

## 2017-11-26 PROCEDURE — 700112 HCHG RX REV CODE 229: Performed by: STUDENT IN AN ORGANIZED HEALTH CARE EDUCATION/TRAINING PROGRAM

## 2017-11-26 PROCEDURE — 770002 HCHG ROOM/CARE - OB PRIVATE (112)

## 2017-11-26 PROCEDURE — A9270 NON-COVERED ITEM OR SERVICE: HCPCS | Performed by: STUDENT IN AN ORGANIZED HEALTH CARE EDUCATION/TRAINING PROGRAM

## 2017-11-26 PROCEDURE — 700102 HCHG RX REV CODE 250 W/ 637 OVERRIDE(OP): Performed by: STUDENT IN AN ORGANIZED HEALTH CARE EDUCATION/TRAINING PROGRAM

## 2017-11-26 RX ORDER — PSEUDOEPHEDRINE HCL 30 MG
100 TABLET ORAL 2 TIMES DAILY PRN
Qty: 60 CAP | Refills: 0 | Status: SHIPPED | OUTPATIENT
Start: 2017-11-26 | End: 2018-01-16

## 2017-11-26 RX ORDER — VITAMIN A ACETATE, BETA CAROTENE, ASCORBIC ACID, CHOLECALCIFEROL, .ALPHA.-TOCOPHEROL ACETATE, DL-, THIAMINE MONONITRATE, RIBOFLAVIN, NIACINAMIDE, PYRIDOXINE HYDROCHLORIDE, FOLIC ACID, CYANOCOBALAMIN, CALCIUM CARBONATE, FERROUS FUMARATE, ZINC OXIDE, CUPRIC OXIDE 3080; 12; 120; 400; 1; 1.84; 3; 20; 22; 920; 25; 200; 27; 10; 2 [IU]/1; UG/1; MG/1; [IU]/1; MG/1; MG/1; MG/1; MG/1; MG/1; [IU]/1; MG/1; MG/1; MG/1; MG/1; MG/1
1 TABLET, FILM COATED ORAL EVERY MORNING
Qty: 30 TAB | Refills: 0 | Status: SHIPPED | OUTPATIENT
Start: 2017-11-26 | End: 2018-01-16

## 2017-11-26 RX ADMIN — IBUPROFEN 800 MG: 800 TABLET, FILM COATED ORAL at 07:53

## 2017-11-26 RX ADMIN — DOCUSATE SODIUM 100 MG: 100 CAPSULE ORAL at 07:53

## 2017-11-26 RX ADMIN — Medication 1 TABLET: at 07:53

## 2017-11-26 RX ADMIN — IBUPROFEN 800 MG: 800 TABLET, FILM COATED ORAL at 15:45

## 2017-11-26 ASSESSMENT — PAIN SCALES - GENERAL
PAINLEVEL_OUTOF10: 0
PAINLEVEL_OUTOF10: 7
PAINLEVEL_OUTOF10: 3
PAINLEVEL_OUTOF10: 3

## 2017-11-26 NOTE — CARE PLAN
Problem: Altered physiologic condition related to immediate post-delivery state and potential for bleeding/hemorrhage  Goal: Patient physiologically stable as evidenced by normal lochia, palpable uterine involution and vital signs within normal limits  Outcome: PROGRESSING AS EXPECTED  Fundus firm, lochia light     Problem: Alteration in comfort related to episiotomy, vaginal repair and/or after birth pains  Goal: Patient is able to ambulate, care for self and infant  Outcome: PROGRESSING AS EXPECTED  Patient ambulating, caring for self and infant

## 2017-11-26 NOTE — PROGRESS NOTES
Assumed care. Assessment completed, fundus firm, lochia light. Denies pain med intervention at this time, will call if pain meds needed. Patient self ambulated to restroom, voiding without difficulty.

## 2017-11-26 NOTE — CONSULTS
Physical assessment of baby and mother provided. Introduction to basics of initiating breastfeeding shown at this time to include posture, angle of latch, hand expression, skin to skin and normal  feeding patterns and expectations. Supplements per guideline as baby is positive Gerry factor. We were able to achieve a comfortable, sustained latch.

## 2017-11-26 NOTE — OP REPORT
..                                                 Circumcision Procedure Note    Date of Procedure: 2017    Pre-Op Diagnosis: Parent(s) desire infant circumcision    Post-Op Diagnosis: Status post infant circumcision    Procedure Type:  Infant circumcision using Gomco clamp  1.1 cm    Anesthesia/Analgesia: 0.6 ml 1% lidocaine dorsal penile block and sucrose (TOOTSWEET) 24% 1-2 cc PO PRN pain/discomfort for 36 or > completed weeks of gestation      Surgeon:  Attending: Meseret Santoyo M.D.                    Estimated Blood Loss: less than 1 ml.    Risks, benefits, and alternatives were discussed with the parent(s) prior to the procedure, and informed consent was obtained.  Signed consent form is in the infant's medical record.      Procedure: Area was prepped and draped in sterile fashion.  Local anesthesia was administered as documented above under Anesthesia/Analgesia.  Circumcision was performed in the usual sterile fashion using a Gomco clamp  1.1 cm.  Good cosmesis and hemostasis was obtained.  Infant tolerated the procedure well and was returned to the  Nursery in excellent condition.  Mother was instructed how to care for the circumcision site.    Meseret Santoyo M.D.

## 2017-11-26 NOTE — PROGRESS NOTES
Bedside report received from Carissa CHAND, assumed care of patient, continuing with the plan of care, patient encouraged to call with needs, denies any at this time. Hourly rounding implemented, call light within reach.

## 2017-11-26 NOTE — DISCHARGE SUMMARY
Discharge Summary:      Astrid Shetty      Admit Date:   2017  Discharge Date:  2017     Admitting diagnosis:  Pregnancy  Indication for care in labor or delivery  Discharge Diagnosis: Status post vaginal, spontaneous.  Pregnancy Complications: none  Tubal Ligation:  no        History:  Past Medical History:   Diagnosis Date   • Allergy     lactose intolerance, vicodin, cherries, tomatoes, pickles, squirt soft drink   • Anemia      OB History    Para Term  AB Living   2 1 1 0 0 1   SAB TAB Ectopic Molar Multiple Live Births   0 0 0 0 0 1      # Outcome Date GA Lbr Perry/2nd Weight Sex Delivery Anes PTL Lv   2 Current            1 Term 07/10/13 39w1d  3.133 kg (6 lb 14.5 oz) F Vag-Spont EPI N SAM      Birth Comments: Denies any complications.           Lactose and Vicodin [hydrocodone-acetaminophen]  Patient Active Problem List    Diagnosis Date Noted   • Sickle cell trait (CMS-[facility]) 09/15/2017     Priority: High   • Screening for sickle-cell disease or trait - positive 2013     Priority: High   • Supervision of normal pregnancy in second trimester 2017     Priority: Medium   • Anemia 2013     Priority: Medium        Hospital Course:   27 y.o. , now para 2, was admitted with the above mentioned diagnosis, underwent Active Labor, vaginal, spontaneous. Patient postpartum course was unremarkable, with progressive advancement in diet , ambulation and toleration of oral analgesia. Patient without complaints today and desires discharge.      Vitals:    17 1121 17 1300 17 1636 17   BP:   122/73 122/57   Pulse: 93  68 72   Resp:   18 16   Temp:  36.1 °C (97 °F) 36.7 °C (98.1 °F) 37.2 °C (98.9 °F)   TempSrc:       SpO2:   97% 95%   Weight:       Height:           Current Facility-Administered Medications   Medication Dose   • ondansetron (ZOFRAN ODT) dispertab 4 mg  4 mg    Or   • ondansetron (ZOFRAN) syringe/vial injection 4 mg  4 mg   •  oxytocin (PITOCIN) infusion (for postpartum)  2,000 mL/hr    Followed by   • oxytocin (PITOCIN) infusion (for postpartum)   mL/hr   • ibuprofen (MOTRIN) tablet 800 mg  800 mg   • acetaminophen (TYLENOL) tablet 325 mg  325 mg   • oxycodone-acetaminophen (PERCOCET) 5-325 MG per tablet 1 Tab  1 Tab   • oxycodone-acetaminophen (PERCOCET-10)  MG per tablet 1 Tab  1 Tab   • oxytocin (PITOCIN) 20 UNITS/1000ML LR (induction of labor)  0.5-20 danilo-units/min   • LR infusion     • docusate sodium (COLACE) capsule 100 mg  100 mg   • prenatal plus vitamin (STUARTNATAL 1+1) 27-1 MG tablet 1 Tab  1 Tab       Exam:  Breast Exam: negative  Abdomen: Abdomen soft, non-tender. BS normal. No masses,  No organomegaly  Fundus Non Tender: yes  Incision: none  Perineum: perineum intact  Extremity: extremities, peripheral pulses and reflexes normal, no edema, redness or tenderness in the calves or thighs     Labs:  Recent Labs      11/25/17   0345  11/25/17   1937   WBC  5.9  10.9*   RBC  3.95*  3.77*   HEMOGLOBIN  12.8  12.4   HEMATOCRIT  35.9*  34.5*   MCV  90.9  91.5   MCH  32.4  32.9   MCHC  35.7*  35.9*   RDW  43.5  43.0   PLATELETCT  150*  153*   MPV  10.7  11.2        Activity:   Discharge to home  Pelvic Rest x 6 weeks    Assessment:  normal postpartum course  Discharge Assessment: No heavy bleeding or foul vaginal discharge , Voiding without difficulty, Taking adequate diet and fluids     Follow up:   Patient discharge on 11/27/17.   TPC or [facility] in 5 weeks   To resume daily PNV and iron supplement if needed with hydration.   Patient to RT TPC or ER if any of the following occur:  Fever over 100.5  Severe abdominal pain  Red streaks or painful masses in the breasts  Foul smelling discharge or lochia  Heavy vaginal bleeding saturating a pad per hour  S/s of PP depression     Discharge Meds:   No current outpatient prescriptions on file.       Siobhan Beck M.D.

## 2017-11-26 NOTE — PROGRESS NOTES
Assessment done. Pt.c/o back pain, and requesting motrin only, motrin given.Fundus firm.Lochia light.Patient's mom at bedside,supportive.Pt. Caring for baby with good skill. Breastfeeding and supplementing.Pt. Has received education on skylight use.plan of care for today discussed.

## 2017-11-26 NOTE — PROGRESS NOTES
Patient arrived on unit at 1615 with infant and L&D RN in wheelchair. Assessment completed. Fundus firm; lochia scant rubra. Patient states pain 8/10; medicated per MAR. Patient oriented to room; verbalizes understanding. Plan of care discussed. All questions and concerns addressed. Call light demonstrated. Bed in lowest, locked position. Will continue to monitor.

## 2017-11-26 NOTE — CARE PLAN
Problem: Altered physiologic condition related to immediate post-delivery state and potential for bleeding/hemorrhage  Goal: Patient physiologically stable as evidenced by normal lochia, palpable uterine involution and vital signs within normal limits  Outcome: PROGRESSING AS EXPECTED  Fundal massage per protocol.    Problem: Potential for postpartum infection related to presence of episiotomy/vaginal tear and/or uterine contamination  Goal: Patient will be absent from signs and symptoms of infection  Outcome: PROGRESSING AS EXPECTED  Patient afebrile. No s/s of infection.

## 2017-11-27 VITALS
DIASTOLIC BLOOD PRESSURE: 59 MMHG | RESPIRATION RATE: 20 BRPM | WEIGHT: 146 LBS | TEMPERATURE: 98 F | HEIGHT: 63 IN | BODY MASS INDEX: 25.87 KG/M2 | SYSTOLIC BLOOD PRESSURE: 110 MMHG | OXYGEN SATURATION: 96 % | HEART RATE: 68 BPM

## 2017-11-27 PROCEDURE — A9270 NON-COVERED ITEM OR SERVICE: HCPCS | Performed by: STUDENT IN AN ORGANIZED HEALTH CARE EDUCATION/TRAINING PROGRAM

## 2017-11-27 PROCEDURE — 700102 HCHG RX REV CODE 250 W/ 637 OVERRIDE(OP): Performed by: STUDENT IN AN ORGANIZED HEALTH CARE EDUCATION/TRAINING PROGRAM

## 2017-11-27 RX ADMIN — Medication 1 TABLET: at 08:18

## 2017-11-27 ASSESSMENT — PAIN SCALES - GENERAL
PAINLEVEL_OUTOF10: 3
PAINLEVEL_OUTOF10: 2
PAINLEVEL_OUTOF10: 3

## 2017-11-27 ASSESSMENT — LIFESTYLE VARIABLES: EVER_SMOKED: NEVER

## 2017-11-27 NOTE — CARE PLAN
Problem: Alteration in comfort related to episiotomy, vaginal repair and/or after birth pains  Goal: Patient verbalizes acceptable pain level  Outcome: PROGRESSING AS EXPECTED  Pt states pain is at a 7 and tolerable. Reviewed 0-10 pain scale and available pain meds with pt. Pt will call for medication as needed. Call light is within reach.    Problem: Potential knowledge deficit related to lack of understanding of self and  care  Goal: Patient will demonstrate ability to care for self and infant  Outcome: PROGRESSING AS EXPECTED  Patient demonstrated knowledge in care for herself and infant with feeding, changing diaper, and comforting.

## 2017-11-27 NOTE — PROGRESS NOTES
A&Ox4, pleasant. Denies pain. Lochia light, fundus firm below umbilicus. No IV access, up self, voiding appropriately. Education done on self care and infant care, all questions and concerns were addressed. Education done on POC, including discharge home, grandmother to bring in infant car seat

## 2017-11-27 NOTE — CARE PLAN
Problem: Knowledge Deficit  Goal: Knowledge of disease process/condition, treatment plan, diagnostic tests, and medications will improve  Outcome: PROGRESSING AS EXPECTED  Extensive education done on self care and infant care, all questions and concern were addressed    Problem: Pain Management  Goal: Pain level will decrease to patient's comfort goal  Outcome: PROGRESSING AS EXPECTED  Denies pain

## 2017-11-27 NOTE — DISCHARGE INSTRUCTIONS
POSTPARTUM DISCHARGE INSTRUCTIONS FOR MOM    YOB: 1990   Age: 27 y.o.               Admit Date: 2017     Discharge Date: 2017  Attending Doctor:  Sunny Montanez M.D.                  Allergies:  Lactose and Vicodin [hydrocodone-acetaminophen]    Discharged to home by car. Discharged via wheelchair, hospital escort: Yes.  Special equipment needed: Not Applicable  Belongings with: Personal  Be sure to schedule a follow-up appointment with your primary care doctor or any specialists as instructed.     Nothing in the vagina (ie Tampons, douching, intercourse,...) for until follow-up in the office in six weeks.   No soaking in bathtubs or hot tubs until follow-up appointment in 6 weeks.   Continue to take your prenatal vitamins while breastfeeding.   Return to ER or see your primary care physician if your symptoms worsen or for any new problems, questions or concerns    Discharge Plan:   Influenza Vaccine Indication: Patient Refuses    REASONS TO CALL YOUR OBSTETRICIAN:  1.   Persistent fever or shaking chills (Temperature higher than 100.4)  2.   Heavy bleeding (soaking more than 1 pad per hour); Passing clots  3.   Foul odor from vagina  4.   Mastitis (Breast infection; breast pain, chills, fever, redness)  5.   Urinary pain, burning or frequency  6.   Episiotomy infection  7.   Abdominal incision infection  8.   Severe depression longer than 24 hours    HAND WASHING  · Prior to handling the baby.  · Before breastfeeding or bottle feeding baby.  · After using the bathroom or changing the baby's diaper.    WOUND CARE  Ask your physician for additional care instructions.  In general:    ·  Incision:      · Keep clean and dry.    · Do NOT lift anything heavier than your baby for up to 6 weeks.    · There should not be any opening or pus.      VAGINAL CARE  · Nothing inside vagina for 6 weeks: no sexual intercourse, tampons or douching.  · Bleeding may continue for 2-4 weeks.  Amount may  "vary.    · Call your physician for heavy bleeding which means soaking more than 1 pad per hour    BIRTH CONTROL  · It is possible to become pregnant at any time after delivery and while breastfeeding.  · Plan to discuss a method of birth control with your physician at your follow up visit. visit.    DIET AND ELIMINATION  · Eating more fiber (bran cereal, fruits, and vegetables) and drinking plenty of fluids will help to avoid constipation.  · Urinary frequency after childbirth is normal.    POSTPARTUM BLUES  During the first few days after birth, you may experience a sense of the \"blues\" which may include impatience, irritability or even crying.  These feeling come and go quickly.  However, as many as 1 in 10 women experience emotional symptoms known as postpartum depression.    Postpartum depression:  May start as early as the second or third day after delivery or take several weeks or months to develop.  Symptoms of \"blues\" are present, but are more intense:  Crying spells; loss of appetite; feelings of hopelessness or loss of control; fear of touching the baby; over concern or no concern at all about the baby; little or no concern about your own appearance/caring for yourself; and/or inability to sleep or excessive sleeping.  Contact your physician if you are experiencing any of these symptoms.    Crisis Hotline:  · [place] Crisis Hotline:  8-131-SWYYURX  Or 1-361.190.3046  · [place] Crisis Hotline:  1-484.894.3164  Or 168-287-5727    PREVENTING SHAKEN BABY:  If you are angry or stressed, PUT THE BABY IN THE CRIB, step away, take some deep breaths, and wait until you are calm to care for the baby.  DO NOT SHAKE THE BABY.  You are not alone, call a supporter for help.    · Crisis Call Center 24/7 crisis line 712-573-8321 or 1-964.358.6792  · You can also text them, text \"ANSWER\" to 269305    QUIT SMOKING/TOBACCO USE:  I understand the use of any tobacco products increases my chance of suffering from future heart " disease and could cause other illnesses which may shorten my life. Quitting the use of tobacco products is the single most important thing I can do to improve my health. For further information on smoking / tobacco cessation call a Toll Free Quit Line at 1-565.557.1565 (*[employer] [place]) or 1-878.771.7069 (American Lung Association) or you can access the web based program at www.lungusa.org.    · [place] Tobacco Users Help Line:  (932) 812-8906       Toll Free: 1-430.532.3579  · Quit Tobacco Program [facility] (054)439-7155    DEPRESSION / SUICIDE RISK:  As you are discharged from this [facility], it is important to learn how to keep safe from harming yourself.    Recognize the warning signs:  · Abrupt changes in personality, positive or negative- including increase in energy   · Giving away possessions  · Change in eating patterns- significant weight changes-  positive or negative  · Change in sleeping patterns- unable to sleep or sleeping all the time   · Unwillingness or inability to communicate  · Depression  · Unusual sadness, discouragement and loneliness  · Talk of wanting to die  · Neglect of personal appearance   · Rebelliousness- reckless behavior  · Withdrawal from people/activities they love  · Confusion- inability to concentrate     If you or a loved one observes any of these behaviors or has concerns about self-harm, here's what you can do:  · Talk about it- your feelings and reasons for harming yourself  · Remove any means that you might use to hurt yourself (examples: pills, rope, extension cords, firearm)  · Get professional help from the community (Mental Health, Substance Abuse, psychological counseling)  · Do not be alone:Call your Safe Contact- someone whom you trust who will be there for you.  · Call your local CRISIS HOTLINE 689-6777 or 546-356-4839  · Call your local Children's Mobile Crisis Response Team [place] (865) 792-7242 or  www.youwho.FDTEK  · Call the toll free National Suicide Prevention Hotlines   · National Suicide Prevention Lifeline 505-092-NCNA (6449)  · National Hope Line Network 800-SUICIDE (689-4709)    DISCHARGE SURVEY:  Thank you for choosing [facility].  We hope we provided you with very good care.  You may be receiving a survey in the mail.  Please fill it out.  Your opinion is valuable to us.    ADDITIONAL EDUCATIONAL MATERIALS GIVEN TO PATIENT:        My signature on this form indicates that:  1.  I have reviewed and understand the above information  2.  My questions regarding this information have been answered to my satisfaction.  3.  I have formulated a plan with my discharge nurse to obtain my prescribed medication for home.

## 2017-11-27 NOTE — PROGRESS NOTES
Received report from Michelle CHAND.  Assumed patient care. Assessment complete. Pt does not need anything at the moment. Pt pain at a 7; refused pain medication. Pt is using tucks and spray for hemorrhoids. Advised pt to call with questions. Will continue postpartum care.

## 2017-11-27 NOTE — PROGRESS NOTES
Discharge order received. No PIV present.  Printed discharge instructions and prescriptions given to pt. All discharge education complete, specifically need to f/u with OBGYN in 5 wks and come back through ED is s/s of hemorrhage/infection, all questions and concerns were addressed. VSS. Labs stable. Volunteer transported pt down to private vehicle via [employer]

## 2017-12-28 ENCOUNTER — POST PARTUM (OUTPATIENT)
Dept: OBGYN | Facility: CLINIC | Age: 27
End: 2017-12-28
Payer: MEDICAID

## 2017-12-28 VITALS — DIASTOLIC BLOOD PRESSURE: 74 MMHG | WEIGHT: 127 LBS | BODY MASS INDEX: 22.5 KG/M2 | SYSTOLIC BLOOD PRESSURE: 110 MMHG

## 2017-12-28 DIAGNOSIS — D57.3 SICKLE CELL TRAIT (HCC): ICD-10-CM

## 2017-12-28 PROBLEM — Z34.92 SUPERVISION OF NORMAL PREGNANCY IN SECOND TRIMESTER: Status: RESOLVED | Noted: 2017-07-27 | Resolved: 2017-12-28

## 2017-12-28 PROCEDURE — 90050 PR POSTPARTUM VISIT: CPT | Performed by: NURSE PRACTITIONER

## 2017-12-28 RX ORDER — SERTRALINE HYDROCHLORIDE 25 MG/1
25 TABLET, FILM COATED ORAL DAILY
Qty: 30 TAB | Refills: 11 | Status: SHIPPED | OUTPATIENT
Start: 2017-12-28 | End: 2018-11-09

## 2017-12-28 ASSESSMENT — ENCOUNTER SYMPTOMS
CONSTITUTIONAL NEGATIVE: 1
NEUROLOGICAL NEGATIVE: 1
MUSCULOSKELETAL NEGATIVE: 1
CARDIOVASCULAR NEGATIVE: 1
DEPRESSION: 1
GASTROINTESTINAL NEGATIVE: 1
EYES NEGATIVE: 1
NERVOUS/ANXIOUS: 1
RESPIRATORY NEGATIVE: 1

## 2017-12-28 NOTE — PROGRESS NOTES
Subjective:    Astrid Shetty is a 27 y.o. female who presents for her postpartum exam 5 weeks following . Her prenatal course was uncomplicated. She denies dysuria, vaginal bleeding, odor, itching or breast problems. She is bottle feeding. She desires an IUD for her birth control method. Reports no sex prior to this appointment. Eating a regular diet without difficulty. She does state she has had decreased appetite since baby was born. Bowel movement are Normal.  The patient is not having any pain. Stopped all lochia/bleeding 2 days ago, no return of menses. Patient Denies Incisional pain, drainage or redness. Patient is having some signs and symptoms of postpartum depression including decreased appetite, inability to sleep, and crying. She also just feels off since having the baby and feels like she is very angry. Offered medication, and educated on how medications work. Also given information on when to seek emergency care including SI or HI. Denies any of these feelings right now.     Problem List     Patient Active Problem List    Diagnosis Date Noted   • Sickle cell trait (CMS-[facility]) 09/15/2017     Priority: High   • Screening for sickle-cell disease or trait - positive 2013     Priority: High   • Supervision of normal pregnancy in second trimester 2017     Priority: Medium   • Anemia 2013     Priority: Medium       Objective    See PE  Lab: H&H at d/c: 12.4/34.5  /74   Wt 57.6 kg (127 lb)   LMP 2017 (Approximate)   BMI 22.50 kg/m²     Assessment:    1. PP care of lactating women   2. Exam WNL   3. Pap WNL on 2016  4. Desires contraception- IUD, but unsure of which one       Plan:    1. Breastfeeding support   2. Continue PNV   3. Contraceptive counseling - follow up w health dept,  [facility], or [facility] for IUD placement and for women's health care needs  4. Encouraged condom use for pregnancy and STI protection  5. Discussed diet, exercise and resumption of  sexual activity   6. Preconception guidance for next pregnancy if applicable. Discussed pregnancy spacing risk factors. Folic acid for all women of childbearing age.     HPI    Review of Systems   Constitutional: Negative.    HENT: Negative.    Eyes: Negative.    Respiratory: Negative.    Cardiovascular: Negative.    Gastrointestinal: Negative.    Genitourinary: Negative.    Musculoskeletal: Negative.    Skin: Negative.    Neurological: Negative.    Endo/Heme/Allergies: Negative.    Psychiatric/Behavioral: Positive for depression. The patient is nervous/anxious.    All other systems reviewed and are negative.         Objective:     /74   Wt 57.6 kg (127 lb)   LMP 02/17/2017 (Approximate)   BMI 22.50 kg/m²      Physical Exam   Constitutional: She is oriented to person, place, and time. She appears well-developed and well-nourished.   HENT:   Head: Normocephalic and atraumatic.   Nose: Nose normal.   Eyes: Conjunctivae and EOM are normal.   Neck: Normal range of motion.   Cardiovascular: Normal rate, regular rhythm, normal heart sounds and intact distal pulses.    Pulmonary/Chest: Effort normal and breath sounds normal.   Abdominal: Soft. Bowel sounds are normal.   Genitourinary: Vagina normal and uterus normal.   Musculoskeletal: Normal range of motion.   Neurological: She is alert and oriented to person, place, and time.   Skin: Skin is warm and dry. Capillary refill takes less than 2 seconds.   Psychiatric: Her speech is normal and behavior is normal. Judgment and thought content normal. Her mood appears anxious. Cognition and memory are normal. She exhibits a depressed mood.   Nursing note and vitals reviewed.       Assessment/Plan:     1. Sickle cell trait (CMS-HCC)  No complications    2. Postpartum depression  Started on Zoloft 25 mg daily    3. Postpartum care and examination  Desires IUD  - REFERRAL TO OB/GYN

## 2017-12-28 NOTE — PROGRESS NOTES
Pt here today for postpartum exam.  Delivery Date 11/25/17   Currently: bottle formula  BCM: pt states desires bc. Low hormone IUD, [facility] and [facility].   Good ph:[phone]  Pt states since baby was born has not felt like herself. Denies suicidal thoughts

## 2018-01-16 ENCOUNTER — GYNECOLOGY VISIT (OUTPATIENT)
Dept: OBGYN | Facility: CLINIC | Age: 28
End: 2018-01-16

## 2018-01-16 VITALS — SYSTOLIC BLOOD PRESSURE: 102 MMHG | DIASTOLIC BLOOD PRESSURE: 58 MMHG | BODY MASS INDEX: 22.14 KG/M2 | WEIGHT: 125 LBS

## 2018-01-16 DIAGNOSIS — Z30.09 ENCOUNTER FOR OTHER GENERAL COUNSELING OR ADVICE ON CONTRACEPTION: ICD-10-CM

## 2018-01-16 PROCEDURE — 99213 OFFICE O/P EST LOW 20 MIN: CPT | Performed by: NURSE PRACTITIONER

## 2018-01-16 ASSESSMENT — ENCOUNTER SYMPTOMS
GASTROINTESTINAL NEGATIVE: 1
EYES NEGATIVE: 1
RESPIRATORY NEGATIVE: 1
CARDIOVASCULAR NEGATIVE: 1
MUSCULOSKELETAL NEGATIVE: 1
NEUROLOGICAL NEGATIVE: 1
CONSTITUTIONAL NEGATIVE: 1
DEPRESSION: 1

## 2018-01-16 NOTE — PROGRESS NOTES
Subjective:      Astrid Shetty is a 27 y.o. female who presents with Other (IUD consult)            Other     Pt is here for birth control consult she is really unsure what she desires to use, but does want a LARC as she got pregnant on OCPS  We did discuss her depression she is not taking the Zoloft she does not think  She needs it she is feeling better every day   offered counseling and she declines this at this time     Review of Systems   Constitutional: Negative.    HENT: Negative.    Eyes: Negative.    Respiratory: Negative.    Cardiovascular: Negative.    Gastrointestinal: Negative.    Genitourinary: Negative.    Musculoskeletal: Negative.    Skin: Negative.    Neurological: Negative.    Endo/Heme/Allergies: Negative.    Psychiatric/Behavioral: Positive for depression. Negative for suicidal ideas.   All other systems reviewed and are negative.         Objective:     /58   Wt 56.7 kg (125 lb)   LMP 01/05/2018   BMI 22.14 kg/m²      Physical Exam  12/28/17 PP exam by Wendy Faust CNM     Pap normal 11/2016  GC/CT 6/29/17     Assessment/Plan:     1. Encounter for other general counseling or advice on contraception  Larc counseling and all birth control methods are discussed. All risks and benefits are discussed. Pt is unsure if she desires Nexplanon and or IUD. She is given brochures on all LARC's.

## 2018-01-16 NOTE — NON-PROVIDER
GYN visit for IUD consult  Pt states she had a vaginal delivery 11/25/2017  LMP: 01/05/2018  WT: 125 lb  BP: 102/58  Good # [phone]

## 2018-02-20 ENCOUNTER — GYNECOLOGY VISIT (OUTPATIENT)
Dept: OBGYN | Facility: CLINIC | Age: 28
End: 2018-02-20
Payer: MEDICAID

## 2018-02-20 VITALS — WEIGHT: 123 LBS | BODY MASS INDEX: 21.79 KG/M2 | DIASTOLIC BLOOD PRESSURE: 66 MMHG | SYSTOLIC BLOOD PRESSURE: 112 MMHG

## 2018-02-20 DIAGNOSIS — Z30.430 ENCOUNTER FOR INSERTION OF MIRENA IUD: ICD-10-CM

## 2018-02-20 DIAGNOSIS — Z32.02 PREGNANCY EXAMINATION OR TEST, NEGATIVE RESULT: ICD-10-CM

## 2018-02-20 LAB
INT CON NEG: NEGATIVE
INT CON POS: POSITIVE
POC URINE PREGNANCY TEST: NEGATIVE

## 2018-02-20 PROCEDURE — 81025 URINE PREGNANCY TEST: CPT | Performed by: NURSE PRACTITIONER

## 2018-02-20 PROCEDURE — 58300 INSERT INTRAUTERINE DEVICE: CPT | Performed by: NURSE PRACTITIONER

## 2018-02-20 RX ORDER — METRONIDAZOLE 500 MG/1
TABLET ORAL
Qty: 4 TAB | Refills: 0 | Status: SHIPPED | OUTPATIENT
Start: 2018-02-20 | End: 2018-11-09

## 2018-02-20 NOTE — PROGRESS NOTES
IUD: Mirena  is choice:    Today the patient is counseled on the risks of IUD insertion. Specifically discussed were alternative forms of birth control. I also discussed with the patient the risk of infection on insertion, and had asked the patient to remain on pelvic rest for one week following the insertion. We also discussed the risk of IUD expulsion, the risk of uterine perforation and IUD migration. If the IUD does migrate the patient may require a separate procedure such as a laparoscopy to retrieve the migrated IUD. I also discussed the 1% risk of pregnancy with IUD use. I also discussed the side effects of Mirena which can be amenorrhea or dysfunctional uterine bleeding or spotting.  Patient had the opportunity to ask questions regarding insertion, risks and benefits, all questions are answered in their entirety.  Informed consent is signed    Procedure note  Urine pregnancy test is negative, informed consent was previously signed  The bimanual exam is performed the uterus is noted to be 7-8 cm in size and is mid position  A speculum was inserted into the vagina, the cervix was cleansed with Betadine swabs x3  Tenaculum was placed on the anterior lip of the cervix at 11:00 and 1:00   The uterus was sounded to 8 centimeters  The IUD is placed under sterile conditions: Mirena  The strings trimmed to approximately 3 cm  Tenaculum was removed from the cervix and hemostasis was achieved with pressure only  The patient tolerated the procedure well    Lot:[id number]  Exp: 8/20  Patient is asked to followup in 4 weeks for IUD check. The patient is asked to remain on pelvic rest for 2-3 days.  Use a backup method for 7 days, condoms. She is asked to return sooner than 5-6 weeks for heavy vaginal bleeding uncontrolled pain or fever                      Risks: Irregular bleeding, pain during and after insertion, migration of device, expulsion of device, pregnancy (ectopic is more common in women with IUD's).  Benefits:  decreased bleeding and cramping, 99.2% effective contraception, good for 5 years, concealed method, well tolerated by most recipients.   Insertion risks are: infection and perforation of the uterus, rare but can happen. Watch for fever, foul smelling discharge, heavy bleeding and abd pain not controlled with Ibuprofen.

## 2018-02-20 NOTE — PATIENT INSTRUCTIONS
discharge instructions:   Return to clinic or call for any:  Fever over 100.4 degrees F  Foul smelling discharge  Severe abdominal pain  Heavy bleeding saturating a pap per hour  Could indicate infection    Please follow instructions:   No sex tampons or douching, nothing int he vagina for 2-3 days   You may take 600 mg (3 OTC) ibuprofen every 6 hours for cramping

## 2018-03-13 ENCOUNTER — GYNECOLOGY VISIT (OUTPATIENT)
Dept: OBGYN | Facility: CLINIC | Age: 28
End: 2018-03-13
Payer: MEDICAID

## 2018-03-13 VITALS — DIASTOLIC BLOOD PRESSURE: 60 MMHG | WEIGHT: 120 LBS | SYSTOLIC BLOOD PRESSURE: 118 MMHG | BODY MASS INDEX: 21.26 KG/M2

## 2018-03-13 DIAGNOSIS — Z30.431 IUD CHECK UP: ICD-10-CM

## 2018-03-13 DIAGNOSIS — R10.2 PELVIC PAIN: ICD-10-CM

## 2018-03-13 PROCEDURE — 99213 OFFICE O/P EST LOW 20 MIN: CPT | Performed by: OBSTETRICS & GYNECOLOGY

## 2018-03-13 RX ORDER — DOXYCYCLINE HYCLATE 100 MG/1
100 CAPSULE ORAL 2 TIMES DAILY
Qty: 20 CAP | Refills: 0 | Status: SHIPPED | OUTPATIENT
Start: 2018-03-13 | End: 2018-11-09

## 2018-03-13 RX ORDER — IBUPROFEN 800 MG/1
800 TABLET ORAL EVERY 8 HOURS PRN
Qty: 30 TAB | Refills: 1 | Status: SHIPPED | OUTPATIENT
Start: 2018-03-13 | End: 2020-08-19

## 2018-03-13 NOTE — NON-PROVIDER
Pt here for IUD check (Mirena) inserted on 2/20/18  # [phone]  Pt states has been on her period x 2 weeks and it is very painful.

## 2018-03-13 NOTE — PROGRESS NOTES
Chief complaint IUD check    27-year-old  status post IUD placement on . Patient is here today for IUD check  Patient states had she's been on her period since proximally 2 weeks  She states that she did not have any pain for the first 2 weeks but then midway through here. She began having excruciating pain she states that Tylenol only relieves the pain for at most one hour. She denies fever nausea and vomiting. She does state that she occasionally has dizziness.    /60   Wt 54.4 kg (120 lb)   LMP 2018   BMI 21.26 kg/m²   Normal external female genitalia  Cervix was examined closed with IUD strings noted at cervical os very minimal cervical motion tenderness  Uterus slightly tender, normal size  Adnexa bilaterally nontender    Assessment and plan 27-year-old female with pain associated with recent IUD placement  Offered removal of IUD today, also offered short course of by mouth antibiotics with Motrin follow-up in one week potential removal at that time    Patient voiced understanding and desires antibiotics and Motrin will follow-up in one week

## 2018-11-09 ENCOUNTER — HOSPITAL ENCOUNTER (EMERGENCY)
Facility: MEDICAL CENTER | Age: 28
End: 2018-11-09
Attending: EMERGENCY MEDICINE
Payer: MEDICAID

## 2018-11-09 VITALS
RESPIRATION RATE: 14 BRPM | BODY MASS INDEX: 20.23 KG/M2 | DIASTOLIC BLOOD PRESSURE: 70 MMHG | HEART RATE: 57 BPM | OXYGEN SATURATION: 99 % | TEMPERATURE: 97.2 F | HEIGHT: 63 IN | WEIGHT: 114.2 LBS | SYSTOLIC BLOOD PRESSURE: 122 MMHG

## 2018-11-09 DIAGNOSIS — J02.9 PHARYNGITIS, UNSPECIFIED ETIOLOGY: ICD-10-CM

## 2018-11-09 LAB
S PYO AG THROAT QL: NORMAL
SIGNIFICANT IND 70042: NORMAL
SITE SITE: NORMAL
SOURCE SOURCE: NORMAL

## 2018-11-09 PROCEDURE — 87880 STREP A ASSAY W/OPTIC: CPT

## 2018-11-09 PROCEDURE — A9270 NON-COVERED ITEM OR SERVICE: HCPCS | Performed by: EMERGENCY MEDICINE

## 2018-11-09 PROCEDURE — 99283 EMERGENCY DEPT VISIT LOW MDM: CPT

## 2018-11-09 PROCEDURE — 700102 HCHG RX REV CODE 250 W/ 637 OVERRIDE(OP): Performed by: EMERGENCY MEDICINE

## 2018-11-09 PROCEDURE — 87081 CULTURE SCREEN ONLY: CPT

## 2018-11-09 RX ORDER — ACETAMINOPHEN 325 MG/1
650 TABLET ORAL ONCE
Status: COMPLETED | OUTPATIENT
Start: 2018-11-09 | End: 2018-11-09

## 2018-11-09 RX ORDER — AMOXICILLIN 500 MG/1
500 CAPSULE ORAL 3 TIMES DAILY
Qty: 30 CAP | Refills: 0 | Status: SHIPPED | OUTPATIENT
Start: 2018-11-09 | End: 2018-11-09

## 2018-11-09 RX ORDER — AMOXICILLIN 500 MG/1
500 CAPSULE ORAL 3 TIMES DAILY
Qty: 30 CAP | Refills: 0 | Status: SHIPPED | OUTPATIENT
Start: 2018-11-09 | End: 2020-08-19

## 2018-11-09 RX ADMIN — ACETAMINOPHEN 650 MG: 325 TABLET, FILM COATED ORAL at 09:22

## 2018-11-09 NOTE — ED PROVIDER NOTES
"ED Provider Note    Chief Complaint:   Sore throat    HPI:  Astrid Shetty is a 28 y.o. female who presents with chief complaint of sore throat and chills.  Her symptoms began yesterday morning when she began feeling generally unwell.  Throughout the day she developed sore throat, pain with swallowing and some generalized malaise.  Today she noticed patchy exudates on the tonsils and presented to the emergency department for evaluation.    She has no significant medical history, reports no shortness of breath, no difficulty swallowing, no drooling. No associated fevers. Symptoms have been progressively worsening since time of onset.    She has no significant past medical history. No impaired immunity, no hyperglycemia nor diabetes.    Review of Systems:  See HPI for pertinent positives and negatives.    Past Medical History:   has a past medical history of Allergy and Anemia.    Social History:  Social History     Social History Main Topics   • Smoking status: Never Smoker   • Smokeless tobacco: Never Used   • Alcohol use No      Comment: occ   • Drug use: No   • Sexual activity: No       Surgical History:  patient denies any surgical history    Current Medications:  Home Medications     Reviewed by Peg Moore R.N. (Registered Nurse) on 11/09/18 at 0837  Med List Status: Complete   Medication Last Dose Status   ibuprofen (MOTRIN) 800 MG Tab  Active                Allergies:  Allergies   Allergen Reactions   • Lactose Diarrhea     Diarrhea     • Vicodin [Hydrocodone-Acetaminophen] Palpitations     Dizzy, lightheaded        Physical Exam:  Vital Signs: /70   Pulse (!) 57   Temp 36.2 °C (97.2 °F)   Resp 14   Ht 1.6 m (5' 3\")   Wt 51.8 kg (114 lb 3.2 oz)   LMP 11/09/2018   SpO2 99%   BMI 20.23 kg/m²   Constitutional: Alert, no acute distress  HENT: Moist mucus membranes, bilateral inflamed tonsils with patchy exudates, uvula midline, no evidence of peritonsillar abscess.  Neck: Supple, normal " range of motion, no stridor, full painless range of motion with flexion and extension  Cardiovascular: Extremities are warm and well perfused, no murmur appreciated, normal cardiac auscultation  Pulmonary: No respiratory distress, normal work of breathing, no accessory muscule usage, breath sounds clear and equal bilaterally  Skin: Warm, dry, no rashes or lesions, no facial swelling    Medical records reviewed. She has had no recent visits for similar symptoms.    Labs:  Labs Reviewed   RAPID STREP,CULT IF INDICATED   BETA STREP SCREEN (GP. A)       ED Medications Administered:  Medications   acetaminophen (TYLENOL) tablet 650 mg (650 mg Oral Given 11/9/18 0922)     MDM:  Patient presents with sore throat as documented above. She is afebrile. No evidence of PTA nor RPA. No evidence of airway compromise. Rapid strep is negative with culture pending. Will treat empirically with amoxicillin given tonsillar exudates. Return precautions discussed including swelling, fevers, difficulty swallowing, shortness of breath, drooling, or any further concerns.    Blood pressure today is greater than 120/80, patient is instructed to follow up with primary care provider for blood pressure recheck.    Disposition:  Discharge home in stable condition.    Final Impression:  1. Pharyngitis, unspecified etiology        Electronically signed by: Eli Bo, 11/9/2018 9:07 PM

## 2018-11-09 NOTE — DISCHARGE INSTRUCTIONS
Please call your primary care physician tomorrow to schedule a follow-up appointment for recheck.  Return to the emergency department if you develop any new or worsening symptoms including worsening sore throat, difficulty swallowing, drooling, difficulty breathing, or any further concerns.

## 2018-11-09 NOTE — ED NOTES
Assumed care of pt from waiting room. Pt ambulatory to room with steady gait.  Fall precautions in place. Call bell in reach. Family at bedside. Awaiting MD eval/orders. Ongoing monitoring.

## 2018-11-11 LAB
S PYO SPEC QL CULT: NORMAL
SIGNIFICANT IND 70042: NORMAL
SITE SITE: NORMAL
SOURCE SOURCE: NORMAL

## 2019-09-03 ENCOUNTER — GYNECOLOGY VISIT (OUTPATIENT)
Dept: OBGYN | Facility: CLINIC | Age: 29
End: 2019-09-03
Payer: COMMERCIAL

## 2019-09-03 VITALS — SYSTOLIC BLOOD PRESSURE: 120 MMHG | BODY MASS INDEX: 23.74 KG/M2 | DIASTOLIC BLOOD PRESSURE: 80 MMHG | WEIGHT: 134 LBS

## 2019-09-03 DIAGNOSIS — Z30.011 ENCOUNTER FOR INITIAL PRESCRIPTION OF CONTRACEPTIVE PILLS: ICD-10-CM

## 2019-09-03 DIAGNOSIS — R45.86 MOOD CHANGES: ICD-10-CM

## 2019-09-03 DIAGNOSIS — Z97.5 FAMILY PLANNING, IUD (INTRAUTERINE DEVICE) IN PLACE: ICD-10-CM

## 2019-09-03 DIAGNOSIS — Z97.5 BREAKTHROUGH BLEEDING ASSOCIATED WITH INTRAUTERINE DEVICE (IUD): ICD-10-CM

## 2019-09-03 DIAGNOSIS — N92.1 BREAKTHROUGH BLEEDING ASSOCIATED WITH INTRAUTERINE DEVICE (IUD): ICD-10-CM

## 2019-09-03 PROCEDURE — 58301 REMOVE INTRAUTERINE DEVICE: CPT | Performed by: OBSTETRICS & GYNECOLOGY

## 2019-09-03 PROCEDURE — 99214 OFFICE O/P EST MOD 30 MIN: CPT | Mod: 25 | Performed by: OBSTETRICS & GYNECOLOGY

## 2019-09-03 RX ORDER — NORGESTIMATE AND ETHINYL ESTRADIOL 0.25-0.035
1 KIT ORAL DAILY
Qty: 28 TAB | Refills: 11 | Status: CANCELLED | OUTPATIENT
Start: 2019-09-03

## 2019-09-03 RX ORDER — NORGESTIMATE AND ETHINYL ESTRADIOL 7DAYSX3 LO
1 KIT ORAL DAILY
Qty: 28 TAB | Refills: 11 | Status: SHIPPED | OUTPATIENT
Start: 2019-09-03 | End: 2020-08-19 | Stop reason: SDUPTHER

## 2019-09-03 NOTE — NON-PROVIDER
Pt here to discuss removal of Mirena  Pt desires other form of BC  Good#[phone]  Pharmacy verified

## 2019-09-03 NOTE — PROGRESS NOTES
Subjective:      Astrid Shetty is a 29 y.o. female who presents for Gynecologic Evaluation            HPI patient is a 29-year-old para 2 who presents today to discuss removal of Mirena IUD which she had used in February 2018.  She is having lots of problems since IUD placement including significant breakthrough bleeding lasting sometimes 3 weeks at a time but bleeding is light, she reports significant acne from her face neck and shoulders and states she is having a lot of mood changes.  Patient states she previously used Ortho Tri-Cyclen which she tolerated well and did not have any abnormal side effects and would like to be started back on Ortho Tri-Cyclen when her IUD was removed.  Patient has no other concerns currently.  Patient states she never tried to feel for her IUD strings.    ROS all organ systems were reviewed and were negative except for complaints in HPI       Objective:     /80   Wt 60.8 kg (134 lb)   LMP 08/12/2019 (Approximate)   BMI 23.74 kg/m²      Physical Exam   Constitutional: She is oriented to person, place, and time. She appears well-developed and well-nourished. No distress.   HENT:   Head: Normocephalic and atraumatic.   Cardiovascular: Normal rate, regular rhythm, normal heart sounds and intact distal pulses.   Pulmonary/Chest: Effort normal and breath sounds normal. No stridor. No respiratory distress.   Abdominal: Soft. Bowel sounds are normal. She exhibits no distension and no mass. There is no tenderness. There is no guarding.   Genitourinary: There is no rash, tenderness, lesion or injury on the right labia. There is no rash, tenderness, lesion or injury on the left labia. Uterus is not enlarged and not tender. Cervix exhibits no motion tenderness, no discharge and no friability. Right adnexum displays no mass, no tenderness and no fullness. Left adnexum displays no mass, no tenderness and no fullness. No erythema, tenderness or bleeding in the vagina. No foreign body  in the vagina. No signs of injury around the vagina. No vaginal discharge found.       Musculoskeletal: Normal range of motion. She exhibits no edema or deformity.   Neurological: She is alert and oriented to person, place, and time. No cranial nerve deficit. Coordination normal.   Skin: She is not diaphoretic.   Psychiatric: She has a normal mood and affect. Her behavior is normal. Judgment and thought content normal.   Nursing note and vitals reviewed.       Procedure:    Patient desires IUD removal and procedure was explained and patient verbally consented.  Speculum was placed and cervix is visualized.  No bleeding or discharge was noted.  IUD strings are visualized.  IUD strings are grasped with ring forcep and removed intact without difficulty.  Patient tolerated procedure well.       Assessment/Plan:     1. Family planning, IUD (intrauterine device) in place  Patient reports multiple symptoms relating to her IUD and requested IUD removal.  IUD removed intact without difficulty after counseling  - REFERRAL TO OB/GYN    2. Breakthrough bleeding associated with intrauterine device (IUD)  We discussed breakthrough bleeding and treatment options.  Desires IUD removal.  IUD was removed.  Patient desires to be started on OCP  - REFERRAL TO OB/GYN    3. Mood changes  Mood changes patient attributes to her IUD.  IUD was removed.  Patient has no suicidal or homicidal ideation  - REFERRAL TO OB/GYN    4. Encounter for initial prescription of contraceptive pills  Patient desires to restart Ortho Tri-Cyclen Lo.  I counseled patient on OCP use including risks, benefits and possible complications including risk for DVT pulmonary embolism blood clots and stroke.  Patient agrees for usage after counseling and prescription was given.  Safe sex and back-up contraception was discussed  - Norgestim-Eth Estrad Triphasic (ORTHO TRI-CYCLEN LO) 0.18/0.215/0.25 MG-25 MCG Tab; Take 1 Tab by mouth every day.  Dispense: 28 Tab; Refill:  11    5.  Precautions and plan of care were discussed
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
## 2020-08-19 ENCOUNTER — TELEMEDICINE (OUTPATIENT)
Dept: OBGYN | Facility: CLINIC | Age: 30
End: 2020-08-19
Payer: COMMERCIAL

## 2020-08-19 DIAGNOSIS — Z30.011 ENCOUNTER FOR INITIAL PRESCRIPTION OF CONTRACEPTIVE PILLS: ICD-10-CM

## 2020-08-19 PROCEDURE — 99213 OFFICE O/P EST LOW 20 MIN: CPT | Mod: 95 | Performed by: OBSTETRICS & GYNECOLOGY

## 2020-08-19 RX ORDER — NORGESTIMATE AND ETHINYL ESTRADIOL 7DAYSX3 LO
1 KIT ORAL DAILY
Qty: 28 TAB | Refills: 3 | Status: SHIPPED | OUTPATIENT
Start: 2020-08-19 | End: 2020-12-31

## 2020-08-19 NOTE — PROGRESS NOTES
Telemedicine Visit: Established Patient     This evaluation was conducted via Zoom, using secure and encrypted videoconferencing technology.  The patient was physical located at Home in [place], [place] and the physician was located in [address] [place] [address].  The patient was presented by self, at home.  The patient's identity was confirmed and verbal consent for the telemedicine encounter was obtained.      Subjective:   CC: refill appointment  Astrid Shetty is a 30 y.o. female presenting for evaluation and management of:    Birth control refill. She is on the Ortho Tricyclen Lo for the past 1 year and really likes it. Having no issues. Was originally on Mirena IUD but having 2 menses per month.   Family history: patient does recall her mother had Hx DVT spontaneously in her 30s. Patient herself and her 2 brothers do not have the same issues.    ROS denies intermenstrual bleeding  Denies any recent fevers or chills. No nausea or vomiting. No chest pains or shortness of breath.     Allergies   Allergen Reactions   • Lactose Diarrhea     Diarrhea     • Vicodin [Hydrocodone-Acetaminophen] Palpitations     Dizzy, lightheaded        Current medicines (including changes today)  Current Outpatient Medications   Medication Sig Dispense Refill   • Norgestim-Eth Estrad Triphasic (ORTHO TRI-CYCLEN LO) 0.18/0.215/0.25 MG-25 MCG Tab Take 1 Tab by mouth every day. 28 Tab 3   • amoxicillin (AMOXIL) 500 MG Cap Take 1 Cap by mouth 3 times a day. 30 Cap 0   • ibuprofen (MOTRIN) 800 MG Tab Take 1 Tab by mouth every 8 hours as needed. 30 Tab 1     No current facility-administered medications for this visit.        Patient Active Problem List    Diagnosis Date Noted   • Sickle cell trait (HCC) 09/15/2017     Priority: High   • Anemia 04/02/2013     Priority: Medium   • Encounter for initial prescription of contraceptive pills 02/20/2018       Family History   Problem Relation Age of Onset   • Hypertension Mother    • Cancer Maternal Grandmother     • Cancer Maternal Grandfather         lung Cancer       She  has a past medical history of Allergy and Anemia. She also has no past medical history of Addisons disease (HCC), Adrenal disorder (HCC), Anxiety, Arrhythmia, Arthritis, ASTHMA, Blood transfusion, Cancer (HCC), CATARACT, CHF (congestive heart failure) (HCC), Clotting disorder (HCC), COPD, Cushings syndrome (HCC), Depression, Diabetes, Diabetic neuropathy ([facility]), EMPHYSEMA, GERD (gastroesophageal reflux disease), Glaucoma, Goiter, Head ache, Headache(784.0), Heart attack (HCC), Heart murmur, HIV (human immunodeficiency virus infection), Hyperlipidemia, Hypertension, IBD (inflammatory bowel disease), Kidney disease, Meningitis, Migraine, Muscle disorder, OSTEOPOROSIS, Parathyroid disorder (HCC), Pituitary disease (HCC), Seizure (HCC), Sickle cell disease (HCC), Stroke ([facility]), Substance abuse ([facility]), Thyroid disease, Tuberculosis, Ulcer, or Urinary tract infection, site not specified.  She  has no past surgical history on file.       Objective:   There were no vitals taken for this visit.    Physical Exam:performed via video  Constitutional: Alert, no distress, well-groomed.  Skin: No rashes in visible areas.  Eye: Round. Conjunctiva clear, lids normal. No icterus.   ENMT: Lips pink without lesions, good dentition, moist mucous membranes. Phonation normal.  Neck: No masses, no thyromegaly. Moves freely without pain.  CV: Pulse as reported by patient  Respiratory: Unlabored respiratory effort, no cough or audible wheeze  Psych: Alert and oriented x3, normal affect and mood.     Assessment and Plan:   The following treatment plan was discussed:     1. Encounter for initial prescription of contraceptive pills  - Norgestim-Eth Estrad Triphasic (ORTHO TRI-CYCLEN LO) 0.18/0.215/0.25 MG-25 MCG Tab; Take 1 Tab by mouth every day.  Dispense: 28 Tab; Refill: 3    We discussed that MITESH are associated with increased risk of VTE (like DVT or PE). Given that her brothers  and herself have had no issues, it is reasonable to continue taking this medication. Patient was advised that she is due for pap smear screening (last done in 2017). She will follow up this year in person for this.    Follow-up: Return in about 1 year (around 8/19/2021).

## 2020-12-28 DIAGNOSIS — Z30.011 ENCOUNTER FOR INITIAL PRESCRIPTION OF CONTRACEPTIVE PILLS: ICD-10-CM

## 2020-12-31 RX ORDER — NORGESTIMATE AND ETHINYL ESTRADIOL 7DAYSX3 LO
KIT ORAL
Qty: 28 TAB | Refills: 9 | Status: SHIPPED | OUTPATIENT
Start: 2020-12-31 | End: 2021-08-31 | Stop reason: SDUPTHER

## 2020-12-31 NOTE — TELEPHONE ENCOUNTER
"Needs to be seen for annual in aug of 2021. Also let her know this Rx will be same as \"ortho triciclen\" but the brand name changee  "

## 2021-08-31 ENCOUNTER — GYNECOLOGY VISIT (OUTPATIENT)
Dept: OBGYN | Facility: CLINIC | Age: 31
End: 2021-08-31
Payer: COMMERCIAL

## 2021-08-31 ENCOUNTER — HOSPITAL ENCOUNTER (OUTPATIENT)
Facility: MEDICAL CENTER | Age: 31
End: 2021-08-31
Attending: OBSTETRICS & GYNECOLOGY
Payer: COMMERCIAL

## 2021-08-31 VITALS — DIASTOLIC BLOOD PRESSURE: 75 MMHG | SYSTOLIC BLOOD PRESSURE: 127 MMHG | BODY MASS INDEX: 25.69 KG/M2 | WEIGHT: 145 LBS

## 2021-08-31 DIAGNOSIS — Z01.419 WELL WOMAN EXAM WITH ROUTINE GYNECOLOGICAL EXAM: ICD-10-CM

## 2021-08-31 DIAGNOSIS — N89.8 VAGINAL ITCHING: ICD-10-CM

## 2021-08-31 DIAGNOSIS — Z30.8 ENCOUNTER FOR OTHER CONTRACEPTIVE MANAGEMENT: ICD-10-CM

## 2021-08-31 LAB
FORWARD REASON: SPWHY: NORMAL
FORWARDED TO LAB: SPWHR: NORMAL
SPECIMEN SENT (2ND): SPWT2: NORMAL
SPECIMEN SENT: SPWT1: NORMAL

## 2021-08-31 PROCEDURE — 99395 PREV VISIT EST AGE 18-39: CPT | Performed by: OBSTETRICS & GYNECOLOGY

## 2021-08-31 RX ORDER — FLUCONAZOLE 150 MG/1
150 TABLET ORAL ONCE
Qty: 1 TABLET | Refills: 0 | Status: SHIPPED | OUTPATIENT
Start: 2021-08-31 | End: 2021-08-31

## 2021-08-31 RX ORDER — NORGESTIMATE AND ETHINYL ESTRADIOL 7DAYSX3 LO
KIT ORAL
Qty: 28 TABLET | Refills: 11 | Status: SHIPPED | OUTPATIENT
Start: 2021-08-31 | End: 2022-09-13 | Stop reason: SDUPTHER

## 2021-08-31 NOTE — NON-PROVIDER
Patient here for annual exam  Last pap done/result: 2017, negative  LMP: 08/18/2021  BCM: OCP's  Last mammogram, if applicable:n/a  Phone number: [phone] (home)   Pharmacy verified  Needs refill on OCP's

## 2021-08-31 NOTE — PROGRESS NOTES
Astrid Shetty is a 31 y.o.  female who presents for her Annual Gynecologic Exam      HPI Comments: Pt presents for her annual well woman exam.   Pt states her vagina feels itchy recently. Tried over the counter Monistat which decreased the discharge but did not relieve the itching.   Patient is currently taking try Tri-Lo-Eunice birth control pill which she is happy with.    Patient's last menstrual period was 2021 (exact date).    Review of Systems:   Pertinent positives documented in HPI and all other systems reviewed & are negative    All PMH, PSH, allergies, social history and FH reviewed and updated today:    PGYN Hx:  Cycles every month, bleeding for 7 week.   Sexually active with male partner  Birth control history: Mirena, currently using MITESH.   STD hx: denies   Pap smear Hx: 2017, denies history of cervical biopsies or excisional procedures.    OB History    Para Term  AB Living   2 2 2 0 0 2   SAB TAB Ectopic Molar Multiple Live Births   0 0 0 0 0 2      # Outcome Date GA Lbr Perry/2nd Weight Sex Delivery Anes PTL Lv   2 Term 17 38w0d   M Vag-Spont EPI N SAM      Birth Comments: Pt states no comoplications   1 Term 07/10/13 39w1d  3.133 kg (6 lb 14.5 oz) F Vag-Spont EPI N SAM      Birth Comments: Denies any complications.        Past Medical History:   Diagnosis Date   • Allergy     lactose intolerance, vicodin, cherries, tomatoes, pickles, squirt soft drink   • Anemia     with last pregnancy       History reviewed. No pertinent surgical history.    Medications:   Current Outpatient Medications Ordered in Epic   Medication Sig Dispense Refill   • Norgestim-Eth Estrad Triphasic (TRI-LO-EUNICE) 0.18/0.215/0.25 MG-25 MCG Tab Take 1 tablet by mouth once daily 28 Tablet 11   • fluconazole (DIFLUCAN) 150 MG tablet Take 1 Tablet by mouth one time for 1 dose. 1 Tablet 0     No current Epic-ordered facility-administered medications on file.       Lactose and Vicodin  [hydrocodone-acetaminophen]    Social History     Socioeconomic History   • Marital status: Single     Spouse name: Not on file   • Number of children: Not on file   • Years of education: Not on file   • Highest education level: Not on file   Occupational History   • Not on file   Tobacco Use   • Smoking status: Never Smoker   • Smokeless tobacco: Never Used   Substance and Sexual Activity   • Alcohol use: No     Comment: occ   • Drug use: No   • Sexual activity: Never     Partners: Male   Other Topics Concern   • Not on file   Social History Narrative    ** Merged History Encounter **         ** Merged History Encounter **          Social Determinants of Health     Financial Resource Strain:    • Difficulty of Paying Living Expenses:    Food Insecurity:    • Worried About Running Out of Food in the Last Year:    • Ran Out of Food in the Last Year:    Transportation Needs:    • Lack of Transportation (Medical):    • Lack of Transportation (Non-Medical):    Physical Activity:    • Days of Exercise per Week:    • Minutes of Exercise per Session:    Stress:    • Feeling of Stress :    Social Connections:    • Frequency of Communication with Friends and Family:    • Frequency of Social Gatherings with Friends and Family:    • Attends Holiness Services:    • Active Member of Clubs or Organizations:    • Attends Club or Organization Meetings:    • Marital Status:    Intimate Partner Violence:    • Fear of Current or Ex-Partner:    • Emotionally Abused:    • Physically Abused:    • Sexually Abused:        Family History   Problem Relation Age of Onset   • Hypertension Mother    • Cancer Maternal Grandmother    • Cancer Maternal Grandfather         lung Cancer        Objective:   Vital measurements:  /75 (BP Location: Right arm, Patient Position: Sitting)   Wt 65.8 kg (145 lb)   Body mass index is 25.69 kg/m². (Goal BM I>18 <25)    Physical Exam   Nursing note and vitals reviewed.  Constitutional: She is oriented to  person, place, and time. She appears well-developed and well-nourished. No distress.     HEENT:   Head: Normocephalic and atraumatic.   Right Ear: External ear normal.   Left Ear: External ear normal.   Nose: Nose normal.   Eyes: Conjunctivae and EOM are normal. Pupils are equal, round, and reactive to light. No scleral icterus.     Neck: Normal range of motion. Neck supple. No tracheal deviation present. No thyromegaly present. No cervical or supraclavicular lymphadenopathy.    Pulmonary/Chest: Effort normal and breath sounds normal. No respiratory distress. She has no wheezes. She has no rales. She exhibits no tenderness.     Cardiovascular: Regular, rate and rhythm. No edema.    Breast: Symmetrical, normal consistency without masses., No dimpling or skin changes. Bilateral fibrodense changes noted. Nipple piercing noted bilaterally     Abdominal: Soft. Bowel sounds are normal. She exhibits no distension and no mass. No tenderness. She has no rebound and no guarding.     Genitourinary:  Pelvic exam was performed with patient supine.  External genitalia with no abnormal pigmentation, labial fusion, rashes, tenderness, lesions or injury to the labia bilaterally.  BUS normal  Vagina is pink and moist with no lesions, foul discharge, erythema, tenderness or bleeding. There is some white discharge.   No foreign body around the vagina or signs of injury.   Cervix exhibits no motion tenderness, no discharge and no friability, no lesions.   Uterus is small not deviated, not enlarged, not fixed and not tender. Some discomfort on bimanual exam.  Right adnexa displays no mass, no tenderness and no fullness.  Left adnexa displays no mass, no tenderness and no fullness.     Musculoskeletal: Normal range of motion. Non tender. She exhibits no edema and no tenderness.     Lymphadenopathy: She has no cervical or supraclavicular adenopathy.     Neurological: She is alert and oriented to person, place, and time. She exhibits normal  muscle tone.     Skin: Skin is warm and dry. No rash noted. She is not diaphoretic. No erythema. No pallor.     Psychiatric: She has a normal mood and affect. Her behavior is normal. Judgment and thought content normal.     Pelvic and breast exams chaperoned by MA.     Assessment:     1. Well woman exam with routine gynecological exam  THINPREP PAP W/HPV AND CTNG   2. Vaginal itching  VAGINAL PATHOGENS DNA PANEL   3. Encounter for other contraceptive management         Plan:     #Well Woman  - Pap and physical exam performed; discussed pap smear screening guidelines  - Self breast awareness discussed.  - Encouraged exercise and proper diet.  - Mammograms starting @ age 40 annually.  -Refill sent for patient's birth control pills.    #Vaginal itching  Sent for Diflucan 150 mg p.o. once.  Discussed would avoid intercourse for 1 week.  Advised patient we did do vaginal pathogen swab today in addition to the Pap smear.  If pathogens swab is positive for bacterial vaginosis, will send for Flagyl p.o.      - Follow up in 1 year for annual exam or sooner as needed

## 2021-09-03 LAB
CANDIDA RRNA VAG QL PROBE: NEGATIVE
G VAGINALIS RRNA GENITAL QL PROBE: NEGATIVE
T VAGINALIS RRNA GENITAL QL PROBE: NEGATIVE

## 2021-09-04 LAB
C TRACH RRNA CVX QL NAA+PROBE: NEGATIVE
CYTOLOGIST CVX/VAG CYTO: NORMAL
CYTOLOGY CVX/VAG DOC CYTO: NORMAL
CYTOLOGY CVX/VAG DOC THIN PREP: NORMAL
DX ICD CODE: NORMAL
HPV I/H RISK 4 DNA CVX QL PROBE+SIG AMP: NEGATIVE
N GONORRHOEA RRNA CVX QL NAA+PROBE: NEGATIVE
NOTE  190109: NORMAL
OTHER STN SPEC: NORMAL
STAT OF ADQ CVX/VAG CYTO-IMP: NORMAL

## 2022-09-13 ENCOUNTER — GYNECOLOGY VISIT (OUTPATIENT)
Dept: OBGYN | Facility: CLINIC | Age: 32
End: 2022-09-13
Payer: MEDICAID

## 2022-09-13 VITALS
DIASTOLIC BLOOD PRESSURE: 72 MMHG | WEIGHT: 150 LBS | SYSTOLIC BLOOD PRESSURE: 116 MMHG | BODY MASS INDEX: 25.61 KG/M2 | HEIGHT: 64 IN

## 2022-09-13 DIAGNOSIS — Z00.00 WELL WOMAN EXAM (NO GYNECOLOGICAL EXAM): Primary | ICD-10-CM

## 2022-09-13 PROBLEM — Z01.419 WELL WOMAN EXAM WITH ROUTINE GYNECOLOGICAL EXAM: Status: RESOLVED | Noted: 2018-02-20 | Resolved: 2022-09-13

## 2022-09-13 PROCEDURE — 99395 PREV VISIT EST AGE 18-39: CPT | Performed by: NURSE PRACTITIONER

## 2022-09-13 RX ORDER — NORGESTIMATE AND ETHINYL ESTRADIOL 7DAYSX3 LO
KIT ORAL
Qty: 28 TABLET | Refills: 11 | Status: SHIPPED | OUTPATIENT
Start: 2022-09-13 | End: 2023-03-03

## 2022-09-13 NOTE — PROGRESS NOTES
Patient here for a GYN follow up.   Last seen on 08/31/2021  No complaints as of today   Needs a refill on bc   pharmacy verified.  Patient phone #: [phone]

## 2022-09-13 NOTE — PROGRESS NOTES
Astrid Shetty is a 32 y.o. y.o. female who presents for her Gynecologic Exam        HPI Comments: Pt presents for well woman exam. Pt has no complaints. Patient's last menstrual period was 2022 (approximate).    Astrid is here today for well woman exam and birth control refill  She is a  with hx of  x 2  She is not currently sexually active, hasn't had a partner since last September  She is using a MITESH for period regulation  Denies any breast changes, pelvic floor changes, discharge, burning, itching, or odor. Denies need for STI testing today.    Pap is up to date, last 2021 and negative    Review of Systems   Pertinent positives documented in HPI and all other systems reviewed & are negative    All PMH, PSH, allergies, social history and FH reviewed and updated today:  Past Medical History:   Diagnosis Date    Allergy     lactose intolerance, vicodin, cherries, tomatoes, pickles, squirt soft drink    Anemia     with last pregnancy     History reviewed. No pertinent surgical history.  Lactose and Vicodin [hydrocodone-acetaminophen]  Social History     Socioeconomic History    Marital status: Single   Tobacco Use    Smoking status: Never    Smokeless tobacco: Never   Vaping Use    Vaping Use: Never used   Substance and Sexual Activity    Alcohol use: No     Comment: occ    Drug use: No    Sexual activity: Not Currently     Partners: Male   Social History Narrative    ** Merged History Encounter **         ** Merged History Encounter **          Family History   Problem Relation Age of Onset    Hypertension Mother     Cancer Maternal Grandmother     Cancer Maternal Grandfather         lung Cancer     Medications:   Current Outpatient Medications Ordered in Epic   Medication Sig Dispense Refill    Norgestim-Eth Estrad Triphasic (TRI-LO-EUNICE) 0.18/0.215/0.25 MG-25 MCG Tab Take 1 tablet by mouth once daily 28 Tablet 11     No current [facility]-ordered facility-administered medications on file.  "         Objective:   Vital measurements:  /72 (BP Location: Right arm, Patient Position: Sitting, BP Cuff Size: Adult)   Ht 1.626 m (5' 4\")   Wt 68 kg (150 lb)   Body mass index is 25.75 kg/m². (Goal BM I>18 <25)    Physical Exam     Chaperone offered: yes    Nursing note and vitals reviewed.  Constitutional: She is oriented to person, place, and time. She appears well-developed and well-nourished. No distress.     HEENT:   Head: Normocephalic and atraumatic.   Right Ear: External ear normal.   Left Ear: External ear normal.   Nose: Nose normal.   Eyes: Conjunctivae and EOM are normal. Pupils are equal, round, and reactive to light. No scleral icterus.     Neck: Normal range of motion. Neck supple. No tracheal deviation present. No thyromegaly present.     Pulmonary/Chest: Effort normal and breath sounds normal. No respiratory distress. She has no wheezes. She has no rales. She exhibits no tenderness.     Cardiovascular: Regular, rate and rhythm. No JVD.    Abdominal: Soft. Bowel sounds are normal. She exhibits no distension and no mass. No tenderness. She has no rebound and no guarding.     Breast:  Inspection negative. No nipple discharge or bleeding    Genitourinary:  Pelvic exam was deferred per patient preference. No concerns    Musculoskeletal: Normal range of motion. She exhibits no edema and no tenderness.     Lymphadenopathy: She has no cervical adenopathy.     Neurological: She is alert and oriented to person, place, and time. She exhibits normal muscle tone.     Skin: Skin is warm and dry. No rash noted. She is not diaphoretic. No erythema. No pallor.     Psychiatric: She has a normal mood and affect. Her behavior is normal. Judgment and thought content normal.          Assessment:     1. Well woman exam (no gynecological exam)          No concerns today  Refill for MITESH sent  Encouraged annual follow up or PRN follow up for problems  Repeat pap in 09/2024    Plan:   Physical exam " performed  Monthly SBE.  Counseling: breast self exam, STD prevention, use and side effects of OCPs, and family planning choices  Encourage exercise and proper diet.  Mammograms starting @ age 40 annually.  See medications and orders placed in encounter report.    Follow up annually or sooner STERLING HANDLEYM, APRN

## 2022-11-02 DIAGNOSIS — Z30.011 ENCOUNTER FOR INITIAL PRESCRIPTION OF CONTRACEPTIVE PILLS: ICD-10-CM

## 2022-11-02 RX ORDER — NORGESTIMATE AND ETHINYL ESTRADIOL 0.25-0.035
1 KIT ORAL DAILY
Qty: 28 TABLET | Refills: 11 | Status: SHIPPED | OUTPATIENT
Start: 2022-11-02 | End: 2024-01-10

## 2022-11-09 ENCOUNTER — TELEPHONE (OUTPATIENT)
Dept: OBGYN | Facility: CLINIC | Age: 32
End: 2022-11-09
Payer: MEDICAID

## 2022-11-09 NOTE — TELEPHONE ENCOUNTER
Phone Number Called: [phone]    Call outcome: Spoke to patient regarding message below.    Message: Received PA for pt BC, called pt and confirmed coverage, pt stated she has already picked up Rx on 11/6/22.       Called pt pharmacy [facility] and notified no PA is needed, pt already picked up Rx Kathrine Bliss acknowledged and will not submit further requests.

## 2022-11-27 ENCOUNTER — APPOINTMENT (OUTPATIENT)
Dept: URGENT CARE | Facility: CLINIC | Age: 32
End: 2022-11-27
Payer: MEDICAID

## 2022-11-28 ENCOUNTER — OFFICE VISIT (OUTPATIENT)
Dept: URGENT CARE | Facility: CLINIC | Age: 32
End: 2022-11-28
Payer: MEDICAID

## 2022-11-28 VITALS
TEMPERATURE: 97.8 F | HEIGHT: 63 IN | WEIGHT: 148 LBS | HEART RATE: 66 BPM | DIASTOLIC BLOOD PRESSURE: 72 MMHG | RESPIRATION RATE: 14 BRPM | OXYGEN SATURATION: 97 % | SYSTOLIC BLOOD PRESSURE: 114 MMHG | BODY MASS INDEX: 26.22 KG/M2

## 2022-11-28 DIAGNOSIS — J10.1 INFLUENZA A: ICD-10-CM

## 2022-11-28 DIAGNOSIS — R05.1 ACUTE COUGH: ICD-10-CM

## 2022-11-28 DIAGNOSIS — G44.89 OTHER HEADACHE SYNDROME: ICD-10-CM

## 2022-11-28 DIAGNOSIS — R09.81 NASAL CONGESTION: ICD-10-CM

## 2022-11-28 LAB
EXTERNAL QUALITY CONTROL: NORMAL
FLUAV+FLUBV AG SPEC QL IA: NORMAL
INT CON NEG: NEGATIVE
INT CON NEG: NEGATIVE
INT CON POS: POSITIVE
INT CON POS: POSITIVE
SARS-COV+SARS-COV-2 AG RESP QL IA.RAPID: NEGATIVE

## 2022-11-28 PROCEDURE — 99214 OFFICE O/P EST MOD 30 MIN: CPT | Performed by: NURSE PRACTITIONER

## 2022-11-28 PROCEDURE — 87804 INFLUENZA ASSAY W/OPTIC: CPT | Performed by: NURSE PRACTITIONER

## 2022-11-28 PROCEDURE — 87426 SARSCOV CORONAVIRUS AG IA: CPT | Performed by: NURSE PRACTITIONER

## 2022-11-28 RX ORDER — OSELTAMIVIR PHOSPHATE 75 MG/1
75 CAPSULE ORAL 2 TIMES DAILY
Qty: 10 CAPSULE | Refills: 0 | Status: SHIPPED | OUTPATIENT
Start: 2022-11-28 | End: 2024-01-10

## 2022-11-28 NOTE — LETTER
November 28, 2022    To Whom It May Concern:         This is confirmation that Astrid Covington Shetty attended her scheduled appointment with RODOLFO Guadalupe on 11/28/22.  Please excuse her from work on the dates of 11/28 and 11/29 due to an acute illness.         If you have any questions please do not hesitate to call me at the phone number listed below.    Sincerely,          PHILIPP GuadalupeRSunithaN.  [phone]

## 2022-11-29 NOTE — PROGRESS NOTES
"Subjective:   Astrid Shetty is a 32 y.o. female who presents for Cough (X 2 days with body aches, headache, chest burns when cough and chills.  Denies fever..)       HPI  Patient presents for evaluation of 2-day history of cough, myalgia, headache, and chills.  Patient has taken home COVID test which was negative.  Her son is here with similar symptoms.  She has taken over-the-counter cough and cold medication with mild relief of her symptoms.  Is documented COVID vaccinated, is due for booster.    ROS  All other systems are negative except as documented above within HPI.    MEDS:   Current Outpatient Medications:     oseltamivir (TAMIFLU) 75 MG Cap, Take 1 Capsule by mouth 2 times a day., Disp: 10 Capsule, Rfl: 0    norgestimate-ethinyl estradiol (SPRINTEC 28) 0.25-35 MG-MCG per tablet, Take 1 Tablet by mouth every day., Disp: 28 Tablet, Rfl: 11    Norgestim-Eth Estrad Triphasic (TRI-LO-EUNICE) 0.18/0.215/0.25 MG-25 MCG Tab, Take 1 tablet by mouth once daily (Patient not taking: Reported on 11/28/2022), Disp: 28 Tablet, Rfl: 11  ALLERGIES:   Allergies   Allergen Reactions    Lactose Diarrhea     Diarrhea      Vicodin [Hydrocodone-Acetaminophen] Palpitations     Dizzy, lightheaded        Patient's PMH, SocHx, SurgHx, FamHx, Drug allergies and medications were reviewed.     Objective:   /72   Pulse 66   Temp 36.6 °C (97.8 °F) (Temporal)   Resp 14   Ht 1.6 m (5' 3\")   Wt 67.1 kg (148 lb)   LMP 11/14/2022   SpO2 97%   BMI 26.22 kg/m²     Physical Exam  Vitals and nursing note reviewed.   Constitutional:       General: She is awake.      Appearance: Normal appearance. She is well-developed and normal weight.   HENT:      Head: Normocephalic and atraumatic.      Right Ear: Tympanic membrane, ear canal and external ear normal.      Left Ear: Tympanic membrane, ear canal and external ear normal.      Nose: Nose normal. No nasal tenderness, mucosal edema, congestion or rhinorrhea.      Right Turbinates: " Enlarged.      Left Turbinates: Enlarged.      Mouth/Throat:      Lips: Pink.      Mouth: Mucous membranes are moist.      Pharynx: Oropharynx is clear. Uvula midline. Posterior oropharyngeal erythema present.   Eyes:      Extraocular Movements: Extraocular movements intact.      Conjunctiva/sclera: Conjunctivae normal.      Pupils: Pupils are equal, round, and reactive to light.   Neck:      Thyroid: No thyromegaly.      Trachea: Trachea normal.   Cardiovascular:      Rate and Rhythm: Normal rate and regular rhythm.      Pulses: Normal pulses.      Heart sounds: Normal heart sounds, S1 normal and S2 normal.   Pulmonary:      Effort: Pulmonary effort is normal. No respiratory distress.      Breath sounds: Normal breath sounds. No wheezing, rhonchi or rales.   Abdominal:      General: Bowel sounds are normal.      Palpations: Abdomen is soft.   Musculoskeletal:         General: Normal range of motion.      Cervical back: Full passive range of motion without pain, normal range of motion and neck supple.   Lymphadenopathy:      Cervical: No cervical adenopathy.   Skin:     General: Skin is warm and dry.      Capillary Refill: Capillary refill takes less than 2 seconds.   Neurological:      General: No focal deficit present.      Mental Status: She is alert and oriented to person, place, and time.      Gait: Gait is intact.   Psychiatric:         Attention and Perception: Attention and perception normal.         Mood and Affect: Mood normal.         Speech: Speech normal.         Behavior: Behavior normal. Behavior is cooperative.         Thought Content: Thought content normal.         Judgment: Judgment normal.       Assessment/Plan:   Assessment    1. Influenza A  - oseltamivir (TAMIFLU) 75 MG Cap; Take 1 Capsule by mouth 2 times a day.  Dispense: 10 Capsule; Refill: 0  - POCT SARS-COV Antigen LES (Symptomatic Only)  - POCT Influenza A/B    2. Acute cough    3. Nasal congestion    4. Other headache  syndrome      Vital signs stable at today's acute urgent care visit.  Review of any test results completed in clinic.  Begin medications as listed.    Advised the patient to follow-up with the primary care provider/urgent care if symptoms persist.  Red flags discussed and indications to immediately call 911 or present to the ED. All questions were encouraged and answered to the patient's satisfaction and understanding, and they agree to the plan of care.     This is an acute problem with uncertain prognosis, medication management and instructions as well as management options were provided.  I personally reviewed prior external notes and test results pertinent to today and independently reviewed and interpreted all diagnostics. Time spent evaluating this patient includes preparing for visit, counseling/education, exam, evaluation, obtaining history, and ordering lab/test/procedures.      Please note that this dictation was created using voice recognition software. I have made a reasonable attempt to correct obvious errors, but I expect that there are errors of grammar and possibly content that I did not discover before finalizing the note.

## 2023-03-03 ENCOUNTER — OFFICE VISIT (OUTPATIENT)
Dept: URGENT CARE | Facility: CLINIC | Age: 33
End: 2023-03-03
Payer: MEDICAID

## 2023-03-03 VITALS
TEMPERATURE: 97.3 F | WEIGHT: 146.5 LBS | OXYGEN SATURATION: 98 % | DIASTOLIC BLOOD PRESSURE: 64 MMHG | BODY MASS INDEX: 25.96 KG/M2 | SYSTOLIC BLOOD PRESSURE: 108 MMHG | HEART RATE: 68 BPM | RESPIRATION RATE: 16 BRPM | HEIGHT: 63 IN

## 2023-03-03 DIAGNOSIS — Z20.818 EXPOSURE TO STREP THROAT: ICD-10-CM

## 2023-03-03 DIAGNOSIS — J02.9 PHARYNGITIS, UNSPECIFIED ETIOLOGY: ICD-10-CM

## 2023-03-03 LAB — S PYO DNA SPEC NAA+PROBE: NOT DETECTED

## 2023-03-03 PROCEDURE — 99213 OFFICE O/P EST LOW 20 MIN: CPT | Performed by: NURSE PRACTITIONER

## 2023-03-03 PROCEDURE — 87651 STREP A DNA AMP PROBE: CPT | Performed by: NURSE PRACTITIONER

## 2023-03-03 ASSESSMENT — ENCOUNTER SYMPTOMS
DIZZINESS: 0
TROUBLE SWALLOWING: 0
VOMITING: 0
FEVER: 0
SWOLLEN GLANDS: 1
HOARSE VOICE: 0
NAUSEA: 0
SHORTNESS OF BREATH: 0
MYALGIAS: 0
EYE PAIN: 0
COUGH: 1
SORE THROAT: 1
CHILLS: 0

## 2023-03-04 NOTE — PROGRESS NOTES
Subjective:   Astrid Shetty is a 32 y.o. female who presents for Pharyngitis (Cough x 2 weeks )      Pharyngitis   This is a new problem. Episode onset: 2weeks; son positive strep. The problem has been unchanged. Neither side of throat is experiencing more pain than the other. There has been no fever. The pain is at a severity of 4/10. The pain is moderate. Associated symptoms include congestion, coughing and swollen glands. Pertinent negatives include no ear discharge, ear pain, hoarse voice, plugged ear sensation, shortness of breath, trouble swallowing or vomiting. She has had exposure to strep. She has had no exposure to mono. She has tried acetaminophen for the symptoms. The treatment provided no relief.     Review of Systems   Constitutional:  Negative for chills and fever.   HENT:  Positive for congestion and sore throat. Negative for ear discharge, ear pain, hoarse voice and trouble swallowing.    Eyes:  Negative for pain.   Respiratory:  Positive for cough. Negative for shortness of breath.    Cardiovascular:  Negative for chest pain.   Gastrointestinal:  Negative for nausea and vomiting.   Genitourinary:  Negative for hematuria.   Musculoskeletal:  Negative for myalgias.   Skin:  Negative for rash.   Neurological:  Negative for dizziness.     Medications:    norgestimate-ethinyl estradiol  oseltamivir Caps    Allergies: Lactose and Vicodin [hydrocodone-acetaminophen]    Problem List: Astrid Shetty does not have any pertinent problems on file.    Surgical History:  No past surgical history on file.    Past Social Hx: Astrid Shetty  reports that she has never smoked. She has never used smokeless tobacco. She reports that she does not drink alcohol and does not use drugs.     Past Family Hx:  Astrid Shetty family history includes Cancer in her maternal grandfather and maternal grandmother; Hypertension in her mother.     Problem list, medications, and allergies reviewed by myself today in  "Epic.     Objective:     /64   Pulse 68   Temp 36.3 °C (97.3 °F) (Temporal)   Resp 16   Ht 1.6 m (5' 3\")   Wt 66.5 kg (146 lb 8 oz)   SpO2 98%   BMI 25.95 kg/m²     Physical Exam  Vitals and nursing note reviewed.   Constitutional:       General: She is not in acute distress.     Appearance: She is well-developed.   HENT:      Head: Normocephalic and atraumatic.      Right Ear: Tympanic membrane and external ear normal.      Left Ear: Tympanic membrane and external ear normal.      Nose: Nose normal.      Right Sinus: No maxillary sinus tenderness or frontal sinus tenderness.      Left Sinus: No maxillary sinus tenderness or frontal sinus tenderness.      Mouth/Throat:      Mouth: Mucous membranes are moist.      Pharynx: Uvula midline. Posterior oropharyngeal erythema present.      Tonsils: No tonsillar exudate or tonsillar abscesses. 1+ on the right. 1+ on the left.   Eyes:      General:         Right eye: No discharge.         Left eye: No discharge.      Conjunctiva/sclera: Conjunctivae normal.   Cardiovascular:      Rate and Rhythm: Normal rate.   Pulmonary:      Effort: Pulmonary effort is normal. No respiratory distress.      Breath sounds: Normal breath sounds.   Abdominal:      General: There is no distension.   Musculoskeletal:         General: Normal range of motion.   Lymphadenopathy:      Head:      Right side of head: No tonsillar adenopathy.      Left side of head: No tonsillar adenopathy.   Skin:     General: Skin is warm and dry.      Findings: No rash.   Neurological:      General: No focal deficit present.      Mental Status: She is alert and oriented to person, place, and time. Mental status is at baseline.      Gait: Gait (gait at baseline) normal.   Psychiatric:         Judgment: Judgment normal.       Assessment/Plan:     Diagnosis and associated orders:     1. Pharyngitis, unspecified etiology  POCT CEPHEID GROUP A STREP - PCR      2. Exposure to strep throat  POCT CEPHEID GROUP " A STREP - PCR         Comments/MDM:   Strep negative  It was explained today that due to the viral nature of the pt's illness, we will treat symptomatically today.   Encouraged OTC supportive meds PRN. Humidification, increase fluids, avoid night time dairy.   Discussed side effects of OTC meds and any prescribed.  Given precautionary s/sx that mandate immediate follow up and evaluation in the ED. Advised of risks of not doing so.    DDX, Supportive care, and indications for immediate follow-up discussed with patient.    Instructed to return to clinic or nearest emergency department if we are not available for any change in condition, further concerns, or worsening of symptoms.    The patient  and/or guardian demonstrated a good understanding and agreed with the treatment plan.                     Please note that this dictation was created using voice recognition software. I have made a reasonable attempt to correct obvious errors, but I expect that there are errors of grammar and possibly content that I did not discover before finalizing the note.    This note was electronically signed by Agapito AYON.

## 2023-03-29 ENCOUNTER — HOSPITAL ENCOUNTER (OUTPATIENT)
Facility: MEDICAL CENTER | Age: 33
End: 2023-03-29
Attending: NURSE PRACTITIONER
Payer: COMMERCIAL

## 2023-03-29 ENCOUNTER — GYNECOLOGY VISIT (OUTPATIENT)
Dept: OBGYN | Facility: CLINIC | Age: 33
End: 2023-03-29
Payer: MEDICAID

## 2023-03-29 VITALS
DIASTOLIC BLOOD PRESSURE: 60 MMHG | SYSTOLIC BLOOD PRESSURE: 108 MMHG | BODY MASS INDEX: 25.03 KG/M2 | HEIGHT: 64 IN | WEIGHT: 146.6 LBS

## 2023-03-29 DIAGNOSIS — N89.8 VAGINAL DISCHARGE: ICD-10-CM

## 2023-03-29 DIAGNOSIS — N89.8 VAGINAL DISCHARGE: Primary | ICD-10-CM

## 2023-03-29 LAB
CANDIDA DNA VAG QL PROBE+SIG AMP: POSITIVE
G VAGINALIS DNA VAG QL PROBE+SIG AMP: POSITIVE
T VAGINALIS DNA VAG QL PROBE+SIG AMP: NEGATIVE

## 2023-03-29 PROCEDURE — 87660 TRICHOMONAS VAGIN DIR PROBE: CPT

## 2023-03-29 PROCEDURE — 99213 OFFICE O/P EST LOW 20 MIN: CPT | Performed by: NURSE PRACTITIONER

## 2023-03-29 PROCEDURE — 87491 CHLMYD TRACH DNA AMP PROBE: CPT

## 2023-03-29 PROCEDURE — 87480 CANDIDA DNA DIR PROBE: CPT

## 2023-03-29 PROCEDURE — 87510 GARDNER VAG DNA DIR PROBE: CPT

## 2023-03-29 PROCEDURE — 87591 N.GONORRHOEAE DNA AMP PROB: CPT

## 2023-03-29 NOTE — PROGRESS NOTES
HPI Comments:  Astrid Shetty is a 32 y.o. y.o. female who presents for problem gyn visit.    Pt reports vaginal discharge with itching and odor..     Patient's last menstrual period was 2023 (exact date).      Pt is sexually active with new male partner  Pt is using OCPs for birth control  .  Review of Systems :  Constitutional:   ROS: Patient is feeling well.   No dyspnea or chest pain on exertion.   No Abdominal pain, change in bowel habits, black or bloody stools.   No urinary sx.   GYN ROS:normal menses, no abnormal bleeding, pelvic pain or discharge, no breast pain or new or enlarging lumps on self exam.   Denies breast tenderness, mass, discharge, changes in size or contour, or abnormal cyclic discomfort.   No neurological complaints.    Past Medical History:   Diagnosis Date    Allergy     lactose intolerance, vicodin, cherries, tomatoes, pickles, squirt soft drink    Anemia     with last pregnancy     OBGYN Hx:   x 2  Allergy:   Lactose and Vicodin [hydrocodone-acetaminophen]  Pertinent positives documented in HPI and all other systems reviewed & are negative    All PMH, PSH, allergies, social history and FH reviewed and updated today:  Past Medical History:   Diagnosis Date    Allergy     lactose intolerance, vicodin, cherries, tomatoes, pickles, squirt soft drink    Anemia     with last pregnancy     No past surgical history on file.  Lactose and Vicodin [hydrocodone-acetaminophen]  Social History     Socioeconomic History    Marital status: Single   Tobacco Use    Smoking status: Never    Smokeless tobacco: Never   Vaping Use    Vaping Use: Never used   Substance and Sexual Activity    Alcohol use: No     Comment: occ    Drug use: No    Sexual activity: Not Currently     Partners: Male   Social History Narrative    ** Merged History Encounter **         ** Merged History Encounter **          Family History   Problem Relation Age of Onset    Hypertension Mother     Cancer Maternal  "Grandmother     Cancer Maternal Grandfather         lung Cancer     Medications:   Current Outpatient Medications Ordered in Epic   Medication Sig Dispense Refill    norgestimate-ethinyl estradiol (SPRINTEC 28) 0.25-35 MG-MCG per tablet Take 1 Tablet by mouth every day. 28 Tablet 11    oseltamivir (TAMIFLU) 75 MG Cap Take 1 Capsule by mouth 2 times a day. (Patient not taking: Reported on 3/29/2023) 10 Capsule 0     No current Epic-ordered facility-administered medications on file.          Objective:   Vital measurements:  /60 (BP Location: Right arm, Patient Position: Sitting, BP Cuff Size: Adult)   Ht 5' 4\"   Wt 146 lb 9.6 oz   Body mass index is 25.16 kg/m². (Goal BM I>18 <25)    Physical Exam   Nursing note and vitals reviewed.  Constitutional: She is oriented to person, place, and time. She appears well-developed and well-nourished. No distress.     Abdominal: Soft. Bowel sounds are normal. She exhibits no distension and no mass. No tenderness. She has no rebound and no guarding.     Breast:  deferred    Genitourinary:  Pelvic exam was performed with patient supine.  External genitalia with no abnormal pigmentation, labial fusion,rash, tenderness, lesion or injury to the labia bilaterally.  Vagina is moist with no lesions, foul discharge, erythema, tenderness or bleeding. No foreign body around the vagina or signs of injury.   Cervix exhibits no motion tenderness, no discharge and no friability.   Uterus is midline not deviated, not enlarged, not fixed and not tender.  Right adnexum displays no mass, no tenderness and no fullness. Left adnexum displays no mass, no tenderness and no fullness.   SSE notes white discharge    Neurological: She is alert and oriented to person, place, and time. She exhibits normal muscle tone.     Skin: Skin is warm and dry. No rash noted. She is not diaphoretic. No erythema. No pallor.     Psychiatric: She has a normal mood and affect. Her behavior is normal. Judgment and " thought content normal.        Assessment:     1. Vaginal discharge  VAGINAL PATHOGENS DNA PANEL    Chlamydia/GC, PCR (Urine)            Plan:   Gyn exam performed   Labs: vaginal pathogen, GCCT  Counseling:   STD prevention  Encourage exercise and proper diet.  MyChart for messages  Pt to follow up prn worsening symptoms    No follow-ups on file.

## 2023-03-29 NOTE — PROGRESS NOTES
"GYN visit for vaginal discharge with itching x \"few days\"   LMP: 03/21/23   Last pap: 08/31/2021  BC: on the pill      "

## 2023-03-30 LAB
C TRACH DNA GENITAL QL NAA+PROBE: NEGATIVE
N GONORRHOEA DNA GENITAL QL NAA+PROBE: NEGATIVE
SPECIMEN SOURCE: NORMAL

## 2023-03-30 RX ORDER — METRONIDAZOLE 500 MG/1
500 TABLET ORAL 2 TIMES DAILY
Qty: 14 TABLET | Refills: 0 | Status: SHIPPED | OUTPATIENT
Start: 2023-03-30 | End: 2023-04-06

## 2023-12-01 ENCOUNTER — OFFICE VISIT (OUTPATIENT)
Dept: URGENT CARE | Facility: CLINIC | Age: 33
End: 2023-12-01
Payer: COMMERCIAL

## 2023-12-01 VITALS
DIASTOLIC BLOOD PRESSURE: 60 MMHG | WEIGHT: 145.4 LBS | SYSTOLIC BLOOD PRESSURE: 108 MMHG | HEIGHT: 63 IN | RESPIRATION RATE: 18 BRPM | TEMPERATURE: 97.3 F | BODY MASS INDEX: 25.76 KG/M2 | OXYGEN SATURATION: 97 % | HEART RATE: 64 BPM

## 2023-12-01 DIAGNOSIS — K62.5 BRIGHT RED RECTAL BLEEDING: ICD-10-CM

## 2023-12-01 PROCEDURE — 3078F DIAST BP <80 MM HG: CPT | Performed by: PHYSICIAN ASSISTANT

## 2023-12-01 PROCEDURE — 3074F SYST BP LT 130 MM HG: CPT | Performed by: PHYSICIAN ASSISTANT

## 2023-12-01 PROCEDURE — 99213 OFFICE O/P EST LOW 20 MIN: CPT | Performed by: PHYSICIAN ASSISTANT

## 2023-12-01 ASSESSMENT — ENCOUNTER SYMPTOMS
CONSTIPATION: 1
FEVER: 0
BLOOD IN STOOL: 1
VOMITING: 0
NAUSEA: 0
EYE DISCHARGE: 0
DIARRHEA: 0
ABDOMINAL PAIN: 0
EYE REDNESS: 0

## 2023-12-01 NOTE — PROGRESS NOTES
Subjective     Astrid Shetty is a 33 y.o. female who presents with Rectal Bleeding (X1DAY bright red blood with bowel movements/)            HPI  This is a new problem.   The patient presents to clinic complaining of rectal bleeding x1 day.  The patient states that she has had 3 episodes of bright red blood per the rectum.  The patient reports no history of the same.  The patient states she has been experiencing symptoms of constipation with hard, firm stools.  The patient states she has also had some pain with bowel movements.  The patient states the symptoms have been ongoing prior to developing the bright red blood per rectum.  The patient states yesterday she had 2 bowel movements in which she noticed bright red blood surrounding the stool and with wiping.  The patient states she had an additional episode of bright red blood per rectum this morning.  The patient reports no abdominal pain.  She also reports no nausea, vomiting, or diarrhea.  The patient reports no recent fevers.  The patient has not taken any OTC medications for her current symptoms.  The patient denies use of anticoagulants.  The patient reports no personal and/or family history of colon cancer.    PMH:  has a past medical history of Allergy and Anemia.    She has no past medical history of Addisons disease ([facility]), Adrenal disorder ([facility]), Anxiety, Arrhythmia, Arthritis, ASTHMA, Blood transfusion, Cancer ([facility]), CATARACT, CHF (congestive heart failure) ([facility]), Clotting disorder ([facility]), COPD, Cushings syndrome ([facility]), Depression, Diabetes, Diabetic neuropathy ([facility]), EMPHYSEMA, GERD (gastroesophageal reflux disease), Glaucoma, Goiter, Head ache, Headache(784.0), Heart attack (HCC), Heart murmur, HIV (human immunodeficiency virus infection), Hyperlipidemia, Hypertension, IBD (inflammatory bowel disease), Kidney disease, Meningitis, Migraine, Muscle disorder, OSTEOPOROSIS, Parathyroid disorder ([facility]), Pituitary disease (HCC), Seizure ([facility]), Sickle  "cell disease (HCC), Stroke (HCC), Substance abuse (HCC), Thyroid disease, Tuberculosis, Ulcer, or Urinary tract infection, site not specified.  MEDS:   Current Outpatient Medications:     oseltamivir (TAMIFLU) 75 MG Cap, Take 1 Capsule by mouth 2 times a day. (Patient not taking: Reported on 3/29/2023), Disp: 10 Capsule, Rfl: 0    norgestimate-ethinyl estradiol (SPRINTEC 28) 0.25-35 MG-MCG per tablet, Take 1 Tablet by mouth every day. (Patient not taking: Reported on 12/1/2023), Disp: 28 Tablet, Rfl: 11  ALLERGIES:   Allergies   Allergen Reactions    Lactose Diarrhea     Diarrhea      Vicodin [Hydrocodone-Acetaminophen] Palpitations     Dizzy, lightheaded      SURGHX: No past surgical history on file.  SOCHX:  reports that she has never smoked. She has never used smokeless tobacco. She reports current alcohol use. She reports that she does not use drugs.  FH: Family history was reviewed, no pertinent findings to report    Review of Systems   Constitutional:  Negative for fever.   Eyes:  Negative for discharge and redness.   Gastrointestinal:  Positive for blood in stool and constipation. Negative for abdominal pain, diarrhea, nausea and vomiting.              Objective     /60 (BP Location: Left arm, Patient Position: Sitting)   Pulse 64   Temp 36.3 °C (97.3 °F) (Temporal)   Resp 18   Ht 1.6 m (5' 3\")   Wt 66 kg (145 lb 6.4 oz)   LMP 11/12/2023 (Exact Date)   SpO2 97%   BMI 25.76 kg/m²      Physical Exam  Constitutional:       General: She is not in acute distress.     Appearance: Normal appearance. She is well-developed. She is not ill-appearing.   HENT:      Head: Normocephalic and atraumatic.      Right Ear: External ear normal.      Left Ear: External ear normal.      Nose: Nose normal.   Eyes:      Extraocular Movements: Extraocular movements intact.      Conjunctiva/sclera: Conjunctivae normal.   Cardiovascular:      Rate and Rhythm: Normal rate and regular rhythm.      Heart sounds: Normal heart " sounds.   Pulmonary:      Effort: Pulmonary effort is normal. No respiratory distress.      Breath sounds: Normal breath sounds. No wheezing.   Abdominal:      General: Bowel sounds are normal.      Palpations: Abdomen is soft.      Tenderness: There is no abdominal tenderness.   Genitourinary:     Comments: Exam deferred.  Musculoskeletal:         General: Normal range of motion.      Cervical back: Normal range of motion and neck supple.   Skin:     General: Skin is warm and dry.   Neurological:      Mental Status: She is alert and oriented to person, place, and time.                             Assessment & Plan        1. Bright red rectal bleeding    The patient's presenting symptoms and physical exam findings are consistent with bright red rectal bleeding.  The patient's general physical exam today in clinic was normal.  The patient's abdomen is soft and nontender with normal bowel sounds.  The patient's external rectal exam was deferred.  The patient is nontoxic and appears in no acute distress.  Patient's vital signs are stable and within normal limits.  She is afebrile today in clinic.  Based on the patient's presenting symptoms and physical exam findings, I have low clinical suspicion for an acute abdominal process.  I suspect the patient's bright red rectal bleeding to be related to an anal fissure and or possible internal hemorrhoid.  The patient states she has been experiencing symptoms of possible constipation with hard, firm stools, as well as some pain with bowel movements.  Instructed the patient to start an OTC stool softener and OTC MiraLAX for her current symptoms.  Advised the patient to monitor worsening signs and or symptoms.  Recommend OTC medications and supportive care for symptomatic management.  Recommend the patient follow-up with primary care.  Discussed return precautions with the patient, and she verbalized understanding.    Differential diagnoses, supportive care, and indications for  immediate follow-up discussed with patient.   Instructed to return to clinic or nearest emergency department for any change in condition, further concerns, or worsening of symptoms.    OTC Tylenol or Motrin for fever/discomfort.  OTC Colace for symptomatic relief  OTC MiraLAX for symptomatic relief  Drink plenty of fluids  High-fiber diet  Monitor for worsening signs or symptoms  Follow-up with PCP  Return to clinic or go to the ED if symptoms worsen or fail to improve, or if the patient should develop worsening/increasing/persistent rectal bleeding, abdominal pain, nausea, vomiting, diarrhea, decreased p.o. intake, fever/chills, and/or any concerning symptoms.       Discussed plan with the patient, and she agrees to the above.     I personally reviewed prior external notes and test results pertinent to today's visit.  I have independently reviewed and interpreted all diagnostics ordered during this urgent care visit.     Please note that this dictation was created using voice recognition software. I have made every reasonable attempt to correct obvious errors, but I expect that there may be errors of grammar and possibly content that I did not discover before finalizing the note.     This note was electronically signed by Renita Castillo PA-C

## 2024-01-10 ENCOUNTER — HOSPITAL ENCOUNTER (OUTPATIENT)
Facility: MEDICAL CENTER | Age: 34
End: 2024-01-10
Attending: PHYSICIAN ASSISTANT
Payer: COMMERCIAL

## 2024-01-10 ENCOUNTER — OFFICE VISIT (OUTPATIENT)
Dept: URGENT CARE | Facility: CLINIC | Age: 34
End: 2024-01-10
Payer: COMMERCIAL

## 2024-01-10 VITALS
OXYGEN SATURATION: 98 % | BODY MASS INDEX: 24.8 KG/M2 | HEART RATE: 64 BPM | TEMPERATURE: 97 F | RESPIRATION RATE: 16 BRPM | WEIGHT: 140 LBS | SYSTOLIC BLOOD PRESSURE: 122 MMHG | HEIGHT: 63 IN | DIASTOLIC BLOOD PRESSURE: 62 MMHG

## 2024-01-10 DIAGNOSIS — N76.0 ACUTE VAGINITIS: ICD-10-CM

## 2024-01-10 PROCEDURE — 3078F DIAST BP <80 MM HG: CPT | Performed by: PHYSICIAN ASSISTANT

## 2024-01-10 PROCEDURE — 87510 GARDNER VAG DNA DIR PROBE: CPT

## 2024-01-10 PROCEDURE — 87491 CHLMYD TRACH DNA AMP PROBE: CPT

## 2024-01-10 PROCEDURE — 87480 CANDIDA DNA DIR PROBE: CPT

## 2024-01-10 PROCEDURE — 87660 TRICHOMONAS VAGIN DIR PROBE: CPT

## 2024-01-10 PROCEDURE — 99213 OFFICE O/P EST LOW 20 MIN: CPT | Performed by: PHYSICIAN ASSISTANT

## 2024-01-10 PROCEDURE — 3074F SYST BP LT 130 MM HG: CPT | Performed by: PHYSICIAN ASSISTANT

## 2024-01-10 PROCEDURE — 87591 N.GONORRHOEAE DNA AMP PROB: CPT

## 2024-01-10 ASSESSMENT — ENCOUNTER SYMPTOMS
BACK PAIN: 0
VAGINITIS: 1
VOMITING: 0
CHILLS: 0
NAUSEA: 0
DIARRHEA: 0
FEVER: 0
ABDOMINAL PAIN: 0
FLANK PAIN: 0
HEADACHES: 0

## 2024-01-10 NOTE — PROGRESS NOTES
Subjective     Astrid Shetty is a 33 y.o. female who presents with Vaginal Discharge (X 1 wk )            Vaginitis  The patient's primary symptoms include a genital odor and vaginal discharge. The patient's pertinent negatives include no genital itching, genital lesions, genital rash, missed menses, pelvic pain or vaginal bleeding. This is a new problem. Episode onset: 1 week. The problem occurs constantly. The problem has been unchanged. The patient is experiencing no pain. She is not pregnant. Pertinent negatives include no abdominal pain, back pain, chills, diarrhea, dysuria, fever, flank pain, frequency, headaches, hematuria, nausea, rash, urgency or vomiting. The vaginal discharge was copious and white. There has been no bleeding. Nothing aggravates the symptoms. She has tried nothing for the symptoms. She is sexually active. It is unknown whether or not her partner has an STD. Her past medical history is significant for vaginosis (history of BV).       Past Medical History:   Diagnosis Date    Allergy     lactose intolerance, vicodin, cherries, tomatoes, pickles, squirt soft drink    Anemia     with last pregnancy         No past surgical history on file.      Family History   Problem Relation Age of Onset    Hypertension Mother     Cancer Maternal Grandmother     Cancer Maternal Grandfather         lung Cancer         Allergies:  Lactose and Vicodin [hydrocodone-acetaminophen]      Medications, Allergies, and current problem list reviewed today in Epic      Review of Systems   Constitutional:  Negative for chills, fever and malaise/fatigue.   Gastrointestinal:  Negative for abdominal pain, diarrhea, nausea and vomiting.   Genitourinary:  Positive for vaginal discharge. Negative for dysuria, flank pain, frequency, hematuria, missed menses, pelvic pain and urgency.   Musculoskeletal:  Negative for back pain.   Skin:  Negative for rash.   Neurological:  Negative for headaches.        All other systems  "reviewed and are negative.         Objective     /62   Pulse 64   Temp 36.1 °C (97 °F)   Resp 16   Ht 1.6 m (5' 3\")   Wt 63.5 kg (140 lb)   SpO2 98%   BMI 24.80 kg/m²      Physical Exam  Constitutional:       General: She is not in acute distress.     Appearance: She is not ill-appearing.   HENT:      Head: Normocephalic and atraumatic.   Eyes:      Conjunctiva/sclera: Conjunctivae normal.   Cardiovascular:      Rate and Rhythm: Normal rate and regular rhythm.   Pulmonary:      Effort: Pulmonary effort is normal. No respiratory distress.      Breath sounds: No stridor. No wheezing.   Genitourinary:     Comments: deferred  Skin:     General: Skin is warm and dry.   Neurological:      General: No focal deficit present.      Mental Status: She is alert and oriented to person, place, and time.   Psychiatric:         Mood and Affect: Mood normal.         Behavior: Behavior normal.         Thought Content: Thought content normal.         Judgment: Judgment normal.                             Assessment & Plan        1. Acute vaginitis    - Chlamydia/GC, PCR (Genital/Anal swab); Future  - VAGINAL PATHOGENS DNA PANEL; Future     Check above and change treatment plan accordingly.     Differential diagnoses, Supportive care, and indications for immediate follow-up discussed with patient.   Pathogenesis of diagnosis discussed including typical length and natural progression.   Instructed to return to clinic or nearest emergency department for any change in condition, further concerns, or worsening of symptoms.      The patient demonstrated a good understanding and agreed with the treatment plan.      Kerrie Claros P.A.-C.               "

## 2024-01-11 LAB
C TRACH DNA GENITAL QL NAA+PROBE: NEGATIVE
CANDIDA DNA VAG QL PROBE+SIG AMP: NEGATIVE
G VAGINALIS DNA VAG QL PROBE+SIG AMP: POSITIVE
N GONORRHOEA DNA GENITAL QL NAA+PROBE: NEGATIVE
SPECIMEN SOURCE: NORMAL
T VAGINALIS DNA VAG QL PROBE+SIG AMP: NEGATIVE

## 2024-01-12 DIAGNOSIS — B96.89 BV (BACTERIAL VAGINOSIS): ICD-10-CM

## 2024-01-12 DIAGNOSIS — N76.0 BV (BACTERIAL VAGINOSIS): ICD-10-CM

## 2024-01-12 RX ORDER — METRONIDAZOLE 500 MG/1
500 TABLET ORAL 2 TIMES DAILY
Qty: 14 TABLET | Refills: 0 | Status: SHIPPED | OUTPATIENT
Start: 2024-01-12 | End: 2024-01-19

## 2024-01-23 NOTE — PROGRESS NOTES
ANNUAL GYNECOLOGY VISIT    Chief Complaint  Annual Exam, Vaginal Discharge, and RLQ Pain      HPI:  Subjective  Astrid Shetty is a 33 y.o. female  Patient's last menstrual period was 2024. Using nothing for contraception who presents today for vaginal odor.  She was on Sprintec 0.25-35 mg for 6 years and wanted to take a break.  She discontinued Sprintec in 2023.  From chart review it does appear the patient may have had a Mirena in the past as well as Ortho Tri-Cyclen LO.  She was treated for BV with flagyl 500 mg bid x 7 days on 1/10/24.  Chlamydia and gonorrhea were negative.  She believes the BV symptoms have improved but she still continues to have a fishy odor and more discharge.  She would like to also complete a Pap today and denies any history of HPV or STDs.  She states she had the Gardasil vaccine when she was younger and is open to completing the series.  Patient also reports that her cycle has been somewhat irregular with only having had 2 days of a period and then only spotting for 3 days.  She also noted spontaneous RLQ pain that started around her cycle, which is not improving, however heat makes it better.  She denies fever, nausea or pain with jumping, but has pain with coughing.  The pain is intermittent.  She sometimes completes self breast exams.  Patient states that her aunt informed her of a family history of breast cancer but it is not clear which member of her family was diagnosed with it.  When asked about any thyroid dysfunction history she states she was seen by a provider previously who advised her to follow-up with her PCP to complete further testing.  This was about a year ago which she has not yet completed.      Preventive Care   Immunization History   Administered Date(s) Administered    PFIZER PURPLE CAP SARS-COV-2 VACCINATION (12+) 2021, 2021    Tdap Vaccine 2013, 2017       Guardasil HPV vaccine: 1st dose completed on 09.  Complete 2nd dose today.     Gynecology History and ROS  Current Sexual Activity: yes - male   History of sexually transmitted diseases? no  Abnormal discharge? yes - some fishy odor.  Current Contraception:  nothing    Menstrual History  Patient's last menstrual period was 2024.  Periods are irregular  q 23 days, lasting 4-5 days. Recent two cycles period was only for 2 days with spotting for additional 3 days.   Clots or heavy flow: yes - sometimes clots  Dysmenorrhea: no, but noted RLQ pain with recent cycle.   Intermenstrual bleeding/spotting: no  Significant pain with periods:no  Bothersome PMS symptoms: yes - nausea and dizziness just prior to cycle.   Significant Pelvic Pain: no    Pap History  Last pap smear: 21 normal (neg/neg), 17 negative for cancer but + for trichomonas, 13 normal.   History of moderate or severe dysplasia: no  Vag Path: 1/10/24 BV +, 3/29/23 Yeast and BV +,     Cancer Risk Assessement:  Family history of:   - Breast cancer: yes, not sure which family member   - Ovarian cancer: no   - Uterine cancer: no   - Colon cancer: no    Obstetric History  OB History    Para Term  AB Living   2 2 2 0 0 2   SAB IAB Ectopic Molar Multiple Live Births   0 0 0 0 0 2      # Outcome Date GA Lbr Perry/2nd Weight Sex Delivery Anes PTL Lv   2 Term 17 38w0d   M Vag-Spont EPI N SAM      Birth Comments: Pt states no comoplications   1 Term 07/10/13 39w1d  6 lb 14.5 oz F Vag-Spont EPI N SAM      Birth Comments: Denies any complications.       Past Medical History  Past Medical History:   Diagnosis Date    Allergy     lactose intolerance, vicodin, cherries, tomatoes, pickles, squirt soft drink    Anemia     with last pregnancy       Past Surgical History  History reviewed. No pertinent surgical history.    Social History  Social History     Tobacco Use    Smoking status: Never    Smokeless tobacco: Never   Vaping Use    Vaping Use: Never used   Substance Use Topics    Alcohol  use: Yes     Comment: occ    Drug use: No        Family History  Family History   Problem Relation Age of Onset    Hypertension Mother     Cancer Maternal Grandmother     Cancer Maternal Grandfather         lung Cancer       Home Medications  No current outpatient medications on file.       Allergies/Reactions  Allergies   Allergen Reactions    Lactose Diarrhea     Diarrhea      Vicodin [Hydrocodone-Acetaminophen] Palpitations     Dizzy, lightheaded        ROS  Gen: no fevers or chills, no significant weight loss or gain, excessive fatigue  Respiratory:  no cough or dyspnea  Cardiac:  no chest pain, no palpitations, no syncope  Breast: no breast discharge, pain, lump or skin changes  GI:  no heartburn, + RLQ pain, no nausea or vomiting  Urinary: no dysuria, urgency, frequency, incontinence   Psych: no depression or anxiety  Neuro: no migraines with aura, fainting spells, numbness or tingling  Extremities: no joint pain, persistently swollen ankles, recurrent leg cramps      Physical Examination:  Vital Signs: /72 (BP Location: Left arm, Patient Position: Sitting, BP Cuff Size: Large adult)   Wt 145 lb 12.8 oz   LMP 01/23/2024   BMI 25.83 kg/m²       Constitutional: The patient is well developed and well nourished.  Psychiatric: Patient is oriented to time place and person.   Skin: No rash observed.  Neck: Appears symmetric. Thyroid normal size  Respiratory: normal effort, no rales, rhonchi or wheezes bilaterally. Clear to ausculation bilaterally.   Heart auscultation: no murmur rub or gallop, RRR  Breast: Inspection reveals no asymetry or nipple discharge, no skin thickening, dimpling or erythema.  Palpation demonstrates no masses.  Abdomen: Soft, BS x 4. Noted tenderness along the RLQ with palpation, no mass.   Pelvic Exam:      Vulva: external female genitalia are normal in appearance. No lesions     Urethra - no lesions, no erythema     Vagina: moist, pink, normal ruggae     Cervix: pink, smooth, no  lesions, no CMT,     Uterus - non-tender, normal size, shape, contour, mobile, anteverted     Ovaries: non-tender, no appreciable masses    Pap Smear performed: Yes    Chaperone Present:  Brigid Barrera MA  Extremeties: Legs are symmetric and without tenderness. There is no edema present.        Assessment & Plan  Astrid Shetty is a 33 y.o. female who presents today for Annual Gyn Exam. See HPI for additional details.     1. Well woman exam with routine gynecological exam  Encouraged adequate water intake, healthy diet, regular exercise. Educated on Pap smear screening and guidelines for age per ACOG and ASCCP. Discussed safe sex, STI prevention, contraception/family planning. Self breast exam taught.       2. Encounter for Papanicolaou smear for cervical cancer screening  I will follow up with patient once results are back as per ASCCP guidelines.  - THINPREP PAP WITH HPV; Future    3. Screening for HPV (human papillomavirus)  - THINPREP PAP WITH HPV; Future    4. BV (bacterial vaginosis)  She was treated for BV with flagyl 500 mg bid x 7 days on 1/10/24.  Advised patient to avoid douching, use condoms if needed with intercourse, avoid new soaps or detergents.  If positive may consider treating with clindamycin cream 2% 1 full applicator qhs x 7 days.   - VAGINAL PATHOGENS DNA PANEL; Future    5. Irregular periods  Will defer to PCP if TSH is abnormal.  Advised patient to follow-up with PCP for thyroid ultrasound which was recommended by previous provider.  - CBC WITH DIFFERENTIAL; Future  - PROLACTIN; Future  - TSH WITH REFLEX TO FT4; Future    6. RLQ abdominal pain  Advised patient should ultrasound come back normal and she continues to have right lower quadrant pain to follow-up with PCP for further evaluation.  - POCT Pregnancy-Negative today.   - US-PELVIC COMPLETE (TRANSABDOMINAL/TRANSVAGINAL) (COMBO); Future    7. Need for HPV vaccine  Second dose completed today. Patient to schedule 3rd dose in  4 months.   - 9VHPV Vaccine 2-3 Dose IM  - Consent for HPV      Return: Annually or PRN    Katerine Hung A.P.R.N.  1/23/2024    This dictation was created using voice recognition software. The accuracy of the dictation is limited to the abilities of the software. I expect there may be some errors of grammar and possibly content.

## 2024-02-01 ENCOUNTER — HOSPITAL ENCOUNTER (OUTPATIENT)
Facility: MEDICAL CENTER | Age: 34
End: 2024-02-01
Attending: NURSE PRACTITIONER
Payer: COMMERCIAL

## 2024-02-01 ENCOUNTER — GYNECOLOGY VISIT (OUTPATIENT)
Dept: OBGYN | Facility: CLINIC | Age: 34
End: 2024-02-01
Payer: COMMERCIAL

## 2024-02-01 VITALS — BODY MASS INDEX: 25.83 KG/M2 | SYSTOLIC BLOOD PRESSURE: 130 MMHG | WEIGHT: 145.8 LBS | DIASTOLIC BLOOD PRESSURE: 72 MMHG

## 2024-02-01 DIAGNOSIS — N92.6 IRREGULAR PERIODS: ICD-10-CM

## 2024-02-01 DIAGNOSIS — N76.0 BV (BACTERIAL VAGINOSIS): ICD-10-CM

## 2024-02-01 DIAGNOSIS — Z01.419 WELL WOMAN EXAM WITH ROUTINE GYNECOLOGICAL EXAM: ICD-10-CM

## 2024-02-01 DIAGNOSIS — Z11.51 SCREENING FOR HPV (HUMAN PAPILLOMAVIRUS): ICD-10-CM

## 2024-02-01 DIAGNOSIS — B96.89 BV (BACTERIAL VAGINOSIS): ICD-10-CM

## 2024-02-01 DIAGNOSIS — R10.31 RLQ ABDOMINAL PAIN: ICD-10-CM

## 2024-02-01 DIAGNOSIS — Z12.4 ENCOUNTER FOR PAPANICOLAOU SMEAR FOR CERVICAL CANCER SCREENING: ICD-10-CM

## 2024-02-01 DIAGNOSIS — Z23 NEED FOR HPV VACCINE: ICD-10-CM

## 2024-02-01 LAB
CANDIDA DNA VAG QL PROBE+SIG AMP: NEGATIVE
G VAGINALIS DNA VAG QL PROBE+SIG AMP: POSITIVE
POCT INT CON NEG: NEGATIVE
POCT INT CON POS: POSITIVE
POCT URINE PREGNANCY TEST: NEGATIVE
T VAGINALIS DNA VAG QL PROBE+SIG AMP: NEGATIVE

## 2024-02-01 PROCEDURE — 87660 TRICHOMONAS VAGIN DIR PROBE: CPT

## 2024-02-01 PROCEDURE — 3078F DIAST BP <80 MM HG: CPT | Performed by: NURSE PRACTITIONER

## 2024-02-01 PROCEDURE — 3075F SYST BP GE 130 - 139MM HG: CPT | Performed by: NURSE PRACTITIONER

## 2024-02-01 PROCEDURE — 90651 9VHPV VACCINE 2/3 DOSE IM: CPT | Performed by: NURSE PRACTITIONER

## 2024-02-01 PROCEDURE — 99214 OFFICE O/P EST MOD 30 MIN: CPT | Mod: 25 | Performed by: NURSE PRACTITIONER

## 2024-02-01 PROCEDURE — 87480 CANDIDA DNA DIR PROBE: CPT

## 2024-02-01 PROCEDURE — 81025 URINE PREGNANCY TEST: CPT | Performed by: NURSE PRACTITIONER

## 2024-02-01 PROCEDURE — 90471 IMMUNIZATION ADMIN: CPT | Performed by: NURSE PRACTITIONER

## 2024-02-01 PROCEDURE — 88175 CYTOPATH C/V AUTO FLUID REDO: CPT

## 2024-02-01 PROCEDURE — 99395 PREV VISIT EST AGE 18-39: CPT | Mod: 25 | Performed by: NURSE PRACTITIONER

## 2024-02-01 PROCEDURE — 87624 HPV HI-RISK TYP POOLED RSLT: CPT

## 2024-02-01 PROCEDURE — 87510 GARDNER VAG DNA DIR PROBE: CPT

## 2024-02-01 NOTE — PROGRESS NOTES
Pt here for annual   Pt states BV positive and R lower abdominal sharp pain   LMP: 1/23/24  Birth control: NO  Last PAP: 9/1/2021  History of STDs: NO  Pharmacy confirmed.   Good # [phone] (home)

## 2024-02-02 DIAGNOSIS — B96.89 BV (BACTERIAL VAGINOSIS): ICD-10-CM

## 2024-02-02 DIAGNOSIS — N76.0 BV (BACTERIAL VAGINOSIS): ICD-10-CM

## 2024-02-02 RX ORDER — METRONIDAZOLE 500 MG/1
500 TABLET ORAL 2 TIMES DAILY
Qty: 14 TABLET | Refills: 0 | Status: SHIPPED | OUTPATIENT
Start: 2024-02-02 | End: 2024-02-09

## 2024-02-02 NOTE — PROGRESS NOTES
Recent vag path again positive for BV. Will treat with Flagyl 500 mg bid x 7 days and once a week boric acid suppository. Patient informed via [employer]. Reviewed with Dr. Pelletier.     CASIMIRO Pacheco.

## 2024-02-09 LAB
CYTOLOGIST CVX/VAG CYTO: NORMAL
CYTOLOGY CVX/VAG DOC CYTO: NORMAL
CYTOLOGY CVX/VAG DOC THIN PREP: NORMAL
HPV I/H RISK 4 DNA CVX QL PROBE+SIG AMP: NEGATIVE
NOTE NL11727A: NORMAL
OTHER STN SPEC: NORMAL
QC REVIEWED BY NL11722A: NORMAL
STAT OF ADQ CVX/VAG CYTO-IMP: NORMAL

## 2024-02-21 ENCOUNTER — APPOINTMENT (OUTPATIENT)
Dept: RADIOLOGY | Facility: MEDICAL CENTER | Age: 34
End: 2024-02-21
Attending: NURSE PRACTITIONER
Payer: COMMERCIAL